# Patient Record
Sex: FEMALE | Race: WHITE | NOT HISPANIC OR LATINO
[De-identification: names, ages, dates, MRNs, and addresses within clinical notes are randomized per-mention and may not be internally consistent; named-entity substitution may affect disease eponyms.]

---

## 2017-01-06 ENCOUNTER — MEDICATION RENEWAL (OUTPATIENT)
Age: 37
End: 2017-01-06

## 2017-02-15 ENCOUNTER — MEDICATION RENEWAL (OUTPATIENT)
Age: 37
End: 2017-02-15

## 2017-03-10 ENCOUNTER — APPOINTMENT (OUTPATIENT)
Dept: FAMILY MEDICINE | Facility: CLINIC | Age: 37
End: 2017-03-10

## 2017-03-10 VITALS
HEART RATE: 103 BPM | OXYGEN SATURATION: 98 % | DIASTOLIC BLOOD PRESSURE: 70 MMHG | WEIGHT: 163 LBS | SYSTOLIC BLOOD PRESSURE: 120 MMHG | BODY MASS INDEX: 27.83 KG/M2 | RESPIRATION RATE: 16 BRPM | HEIGHT: 64 IN

## 2017-03-10 DIAGNOSIS — R21 RASH AND OTHER NONSPECIFIC SKIN ERUPTION: ICD-10-CM

## 2017-03-10 LAB
BILIRUB UR QL STRIP: NEGATIVE
GLUCOSE UR-MCNC: NEGATIVE
HCG UR QL: 0.2 EU/DL
HGB UR QL STRIP.AUTO: NORMAL
KETONES UR-MCNC: NEGATIVE
LEUKOCYTE ESTERASE UR QL STRIP: NORMAL
NITRITE UR QL STRIP: NEGATIVE
PH UR STRIP: 7
PROT UR STRIP-MCNC: NORMAL
SP GR UR STRIP: 1.03

## 2017-03-13 LAB — BACTERIA UR CULT: ABNORMAL

## 2017-03-14 LAB — CORE LAB FLUID CYTOLOGY: NORMAL

## 2017-03-17 ENCOUNTER — APPOINTMENT (OUTPATIENT)
Dept: FAMILY MEDICINE | Facility: CLINIC | Age: 37
End: 2017-03-17

## 2017-03-17 VITALS
HEART RATE: 83 BPM | WEIGHT: 163 LBS | OXYGEN SATURATION: 99 % | RESPIRATION RATE: 16 BRPM | SYSTOLIC BLOOD PRESSURE: 120 MMHG | HEIGHT: 64 IN | DIASTOLIC BLOOD PRESSURE: 70 MMHG | BODY MASS INDEX: 27.83 KG/M2

## 2017-05-01 ENCOUNTER — APPOINTMENT (OUTPATIENT)
Dept: FAMILY MEDICINE | Facility: CLINIC | Age: 37
End: 2017-05-01

## 2017-05-01 VITALS
SYSTOLIC BLOOD PRESSURE: 110 MMHG | DIASTOLIC BLOOD PRESSURE: 60 MMHG | TEMPERATURE: 98.6 F | HEIGHT: 64 IN | OXYGEN SATURATION: 98 % | BODY MASS INDEX: 28 KG/M2 | RESPIRATION RATE: 16 BRPM | WEIGHT: 164 LBS | HEART RATE: 105 BPM

## 2017-06-16 ENCOUNTER — OTHER (OUTPATIENT)
Age: 37
End: 2017-06-16

## 2017-10-12 ENCOUNTER — MEDICATION RENEWAL (OUTPATIENT)
Age: 37
End: 2017-10-12

## 2017-10-16 ENCOUNTER — MESSAGE (OUTPATIENT)
Age: 37
End: 2017-10-16

## 2017-10-18 ENCOUNTER — RESULT REVIEW (OUTPATIENT)
Age: 37
End: 2017-10-18

## 2017-10-23 ENCOUNTER — APPOINTMENT (OUTPATIENT)
Dept: FAMILY MEDICINE | Facility: CLINIC | Age: 37
End: 2017-10-23
Payer: COMMERCIAL

## 2017-10-23 VITALS
TEMPERATURE: 98.1 F | BODY MASS INDEX: 28.51 KG/M2 | HEART RATE: 96 BPM | HEIGHT: 64 IN | DIASTOLIC BLOOD PRESSURE: 70 MMHG | OXYGEN SATURATION: 98 % | SYSTOLIC BLOOD PRESSURE: 120 MMHG | WEIGHT: 167 LBS | RESPIRATION RATE: 16 BRPM

## 2017-10-23 DIAGNOSIS — H10.33 UNSPECIFIED ACUTE CONJUNCTIVITIS, BILATERAL: ICD-10-CM

## 2017-10-23 PROCEDURE — 99214 OFFICE O/P EST MOD 30 MIN: CPT | Mod: 25

## 2017-10-23 PROCEDURE — 94010 BREATHING CAPACITY TEST: CPT

## 2017-10-23 RX ORDER — NITROFURANTOIN (MONOHYDRATE/MACROCRYSTALS) 25; 75 MG/1; MG/1
100 CAPSULE ORAL
Qty: 14 | Refills: 0 | Status: DISCONTINUED | COMMUNITY
Start: 2017-03-10 | End: 2017-10-23

## 2017-10-23 RX ORDER — ALBUTEROL SULFATE 90 UG/1
108 (90 BASE) AEROSOL, METERED RESPIRATORY (INHALATION) EVERY 4 HOURS
Qty: 1 | Refills: 5 | Status: DISCONTINUED | COMMUNITY
Start: 2017-02-15 | End: 2017-10-23

## 2018-03-19 ENCOUNTER — APPOINTMENT (OUTPATIENT)
Dept: FAMILY MEDICINE | Facility: CLINIC | Age: 38
End: 2018-03-19
Payer: COMMERCIAL

## 2018-03-19 ENCOUNTER — LABORATORY RESULT (OUTPATIENT)
Age: 38
End: 2018-03-19

## 2018-03-19 VITALS
WEIGHT: 167 LBS | DIASTOLIC BLOOD PRESSURE: 70 MMHG | SYSTOLIC BLOOD PRESSURE: 120 MMHG | BODY MASS INDEX: 28.51 KG/M2 | HEIGHT: 64 IN | RESPIRATION RATE: 16 BRPM | HEART RATE: 99 BPM | OXYGEN SATURATION: 98 %

## 2018-03-19 DIAGNOSIS — N76.0 ACUTE VAGINITIS: ICD-10-CM

## 2018-03-19 PROCEDURE — 99214 OFFICE O/P EST MOD 30 MIN: CPT

## 2018-03-20 LAB
25(OH)D3 SERPL-MCNC: 22.5 NG/ML
ALBUMIN SERPL ELPH-MCNC: 4.4 G/DL
ALP BLD-CCNC: 72 U/L
ALT SERPL-CCNC: 22 U/L
AMYLASE/CREAT SERPL: 60 U/L
ANION GAP SERPL CALC-SCNC: 18 MMOL/L
APPEARANCE: CLEAR
AST SERPL-CCNC: 24 U/L
BASOPHILS # BLD AUTO: 0.03 K/UL
BASOPHILS NFR BLD AUTO: 0.2 %
BILIRUB SERPL-MCNC: 0.5 MG/DL
BILIRUBIN URINE: NEGATIVE
BLOOD URINE: ABNORMAL
BUN SERPL-MCNC: 17 MG/DL
CALCIUM SERPL-MCNC: 9 MG/DL
CHLORIDE SERPL-SCNC: 103 MMOL/L
CHOLEST SERPL-MCNC: 177 MG/DL
CHOLEST/HDLC SERPL: 2.7 RATIO
CO2 SERPL-SCNC: 20 MMOL/L
COLOR: YELLOW
CREAT SERPL-MCNC: 0.79 MG/DL
CREAT SPEC-SCNC: 110 MG/DL
EOSINOPHIL # BLD AUTO: 0.25 K/UL
EOSINOPHIL NFR BLD AUTO: 2.1 %
ERYTHROCYTE [SEDIMENTATION RATE] IN BLOOD BY WESTERGREN METHOD: 12 MM/HR
FERRITIN SERPL-MCNC: 47 NG/ML
FOLATE SERPL-MCNC: >20 NG/ML
GLUCOSE QUALITATIVE U: NEGATIVE MG/DL
GLUCOSE SERPL-MCNC: 80 MG/DL
HBA1C MFR BLD HPLC: 5.2 %
HCT VFR BLD CALC: 42.5 %
HDLC SERPL-MCNC: 65 MG/DL
HGB BLD-MCNC: 14.1 G/DL
IMM GRANULOCYTES NFR BLD AUTO: 0.2 %
KETONES URINE: NEGATIVE
LDLC SERPL CALC-MCNC: 99 MG/DL
LEUKOCYTE ESTERASE URINE: ABNORMAL
LPL SERPL-CCNC: 32 U/L
LYMPHOCYTES # BLD AUTO: 3.2 K/UL
LYMPHOCYTES NFR BLD AUTO: 26.5 %
MAN DIFF?: NORMAL
MCHC RBC-ENTMCNC: 30.7 PG
MCHC RBC-ENTMCNC: 33.2 GM/DL
MCV RBC AUTO: 92.6 FL
MICROALBUMIN 24H UR DL<=1MG/L-MCNC: 0.3 MG/DL
MICROALBUMIN/CREAT 24H UR-RTO: 3 MG/G
MONOCYTES # BLD AUTO: 0.51 K/UL
MONOCYTES NFR BLD AUTO: 4.2 %
NEUTROPHILS # BLD AUTO: 8.07 K/UL
NEUTROPHILS NFR BLD AUTO: 66.8 %
NITRITE URINE: NEGATIVE
PH URINE: 5
PLATELET # BLD AUTO: 288 K/UL
POTASSIUM SERPL-SCNC: 4 MMOL/L
PROT SERPL-MCNC: 7.3 G/DL
PROTEIN URINE: NEGATIVE MG/DL
PSA FREE FLD-MCNC: NORMAL
PSA FREE SERPL-MCNC: <0.01 NG/ML
PSA SERPL-MCNC: <0.01 NG/ML
RBC # BLD: 4.59 M/UL
RBC # FLD: 13.4 %
RHEUMATOID FACT SER QL: <7 IU/ML
SODIUM SERPL-SCNC: 141 MMOL/L
SPECIFIC GRAVITY URINE: 1.02
T3RU NFR SERPL: 1.1 INDEX
T4 FREE SERPL-MCNC: 1 NG/DL
TRIGL SERPL-MCNC: 67 MG/DL
TSH SERPL-ACNC: 2.41 UIU/ML
UROBILINOGEN URINE: NEGATIVE MG/DL
VIT B12 SERPL-MCNC: 577 PG/ML
WBC # FLD AUTO: 12.08 K/UL

## 2018-03-27 ENCOUNTER — APPOINTMENT (OUTPATIENT)
Dept: FAMILY MEDICINE | Facility: CLINIC | Age: 38
End: 2018-03-27

## 2018-03-27 LAB
ANA PAT FLD IF-IMP: ABNORMAL
ANA SER IF-ACNC: ABNORMAL
CLAM IGE QN: <0.1 KUA/L
CODFISH IGE QN: <0.1 KUA/L
CORN IGE QN: <0.1 KUA/L
COW MILK IGE QN: <0.1 KUA/L
DEPRECATED CLAM IGE RAST QL: 0
DEPRECATED CODFISH IGE RAST QL: 0
DEPRECATED CORN IGE RAST QL: 0
DEPRECATED COW MILK IGE RAST QL: 0
DEPRECATED EGG WHITE IGE RAST QL: 0
DEPRECATED GLUTEN IGE RAST QL: <0.1 KUA/L
DEPRECATED PEANUT IGE RAST QL: 0
DEPRECATED SCALLOP IGE RAST QL: 0.43 KUA/L
DEPRECATED SESAME SEED IGE RAST QL: 0
DEPRECATED SHRIMP IGE RAST QL: 0
DEPRECATED SOYBEAN IGE RAST QL: 0
DEPRECATED WALNUT IGE RAST QL: 0
DEPRECATED WHEAT IGE RAST QL: 0
EGG WHITE IGE QN: <0.1 KUA/L
ENA SS-A AB SER IA-ACNC: <0.2 AL
ENA SS-B AB SER IA-ACNC: <0.2 AL
GLUTEN IGG QN: 0
PEANUT IGE QN: <0.1 KUA/L
SCALLOP IGE QN: <0.1 KUA/L
SCALLOP IGE QN: ABNORMAL
SESAME SEED IGE QN: <0.1 KUA/L
SOYBEAN IGE QN: <0.1 KUA/L
TOTAL IGE SMQN RAST: 165 KU/L
TTG IGA SER IA-ACNC: <5 UNITS
TTG IGA SER-ACNC: NEGATIVE
TTG IGG SER IA-ACNC: <5 UNITS
TTG IGG SER IA-ACNC: NEGATIVE
WALNUT IGE QN: <0.1 KUA/L
WHEAT IGE QN: <0.1 KUA/L

## 2018-04-17 ENCOUNTER — APPOINTMENT (OUTPATIENT)
Dept: FAMILY MEDICINE | Facility: CLINIC | Age: 38
End: 2018-04-17

## 2018-06-04 ENCOUNTER — APPOINTMENT (OUTPATIENT)
Dept: FAMILY MEDICINE | Facility: CLINIC | Age: 38
End: 2018-06-04
Payer: COMMERCIAL

## 2018-06-04 VITALS
RESPIRATION RATE: 22 BRPM | HEART RATE: 94 BPM | SYSTOLIC BLOOD PRESSURE: 118 MMHG | DIASTOLIC BLOOD PRESSURE: 78 MMHG | OXYGEN SATURATION: 98 % | TEMPERATURE: 98.4 F

## 2018-06-04 PROCEDURE — 99214 OFFICE O/P EST MOD 30 MIN: CPT

## 2018-06-06 VITALS — BODY MASS INDEX: 28.51 KG/M2 | HEIGHT: 64 IN | WEIGHT: 167 LBS

## 2018-07-11 ENCOUNTER — APPOINTMENT (OUTPATIENT)
Dept: FAMILY MEDICINE | Facility: CLINIC | Age: 38
End: 2018-07-11
Payer: COMMERCIAL

## 2018-07-11 VITALS
BODY MASS INDEX: 27.32 KG/M2 | RESPIRATION RATE: 23 BRPM | WEIGHT: 170 LBS | SYSTOLIC BLOOD PRESSURE: 112 MMHG | DIASTOLIC BLOOD PRESSURE: 80 MMHG | HEIGHT: 66 IN | HEART RATE: 93 BPM | OXYGEN SATURATION: 98 %

## 2018-07-11 DIAGNOSIS — K82.4 CHOLESTEROLOSIS OF GALLBLADDER: ICD-10-CM

## 2018-07-11 DIAGNOSIS — K82.8 OTHER SPECIFIED DISEASES OF GALLBLADDER: ICD-10-CM

## 2018-07-11 PROCEDURE — 99215 OFFICE O/P EST HI 40 MIN: CPT

## 2018-07-11 RX ORDER — NYSTATIN 100000 [USP'U]/G
100000 CREAM TOPICAL
Qty: 1 | Refills: 0 | Status: DISCONTINUED | COMMUNITY
Start: 2018-03-19 | End: 2018-07-11

## 2018-07-11 RX ORDER — PREDNISONE 10 MG/1
10 TABLET ORAL
Qty: 19 | Refills: 2 | Status: DISCONTINUED | COMMUNITY
Start: 2017-10-23 | End: 2018-07-11

## 2018-07-11 RX ORDER — FLUTICASONE PROPIONATE 50 UG/1
50 SPRAY, METERED NASAL TWICE DAILY
Qty: 1 | Refills: 5 | Status: DISCONTINUED | COMMUNITY
Start: 2018-03-19 | End: 2018-07-11

## 2018-07-11 NOTE — RESULTS/DATA
[] : results reviewed [de-identified] : 8.99/14.9/45.2/307 [de-identified] : Inr: 1.0 [de-identified] : 144/4.0/107/27/10/0.8/91 [de-identified] : NSR @ 65 bpm [de-identified] : Blood type: O Positive\par U/A:Large blood (had menses)\par HCG: Negative

## 2018-07-11 NOTE — PHYSICAL EXAM
[No Acute Distress] : no acute distress [Well Nourished] : well nourished [Well Developed] : well developed [Well-Appearing] : well-appearing [Normal Sclera/Conjunctiva] : normal sclera/conjunctiva [PERRL] : pupils equal round and reactive to light [EOMI] : extraocular movements intact [Normal Outer Ear/Nose] : the outer ears and nose were normal in appearance [Normal Oropharynx] : the oropharynx was normal [No JVD] : no jugular venous distention [Supple] : supple [No Lymphadenopathy] : no lymphadenopathy [No Respiratory Distress] : no respiratory distress  [Clear to Auscultation] : lungs were clear to auscultation bilaterally [No Accessory Muscle Use] : no accessory muscle use [Normal Rate] : normal rate  [Regular Rhythm] : with a regular rhythm [Normal S1, S2] : normal S1 and S2 [No Murmur] : no murmur heard [No Varicosities] : no varicosities [Pedal Pulses Present] : the pedal pulses are present [No Edema] : there was no peripheral edema [No Extremity Clubbing/Cyanosis] : no extremity clubbing/cyanosis [Soft] : abdomen soft [Non Tender] : non-tender [Non-distended] : non-distended [No Masses] : no abdominal mass palpated [No HSM] : no HSM [Normal Bowel Sounds] : normal bowel sounds [Normal Posterior Cervical Nodes] : no posterior cervical lymphadenopathy [Normal Anterior Cervical Nodes] : no anterior cervical lymphadenopathy [No CVA Tenderness] : no CVA  tenderness [No Spinal Tenderness] : no spinal tenderness [No Joint Swelling] : no joint swelling [Grossly Normal Strength/Tone] : grossly normal strength/tone [No Rash] : no rash [Normal Gait] : normal gait [Coordination Grossly Intact] : coordination grossly intact [No Focal Deficits] : no focal deficits [Deep Tendon Reflexes (DTR)] : deep tendon reflexes were 2+ and symmetric [Normal Affect] : the affect was normal [Normal Insight/Judgement] : insight and judgment were intact

## 2018-07-11 NOTE — REVIEW OF SYSTEMS
[Dryness] : dryness  [Abdominal Pain] : abdominal pain [Anxiety] : anxiety [Depression] : depression [Negative] : Heme/Lymph [FreeTextEntry4] : dry mouth

## 2018-07-11 NOTE — HISTORY OF PRESENT ILLNESS
[FreeTextEntry1] : Cholecystectomy [FreeTextEntry2] : 7/13/18 [FreeTextEntry3] : Dr Grace [FreeTextEntry4] : Patient is a 38-year-old female presents today for medical clearance for cholecystectomy at [facility] on 7/13/18 with Dr. Grace. All results available discussed at length. We discussed the procedure, the recovery and will her results at length. All questions answered. Time spent with patient was 40 minutes face-to-face.

## 2018-11-06 ENCOUNTER — APPOINTMENT (OUTPATIENT)
Dept: FAMILY MEDICINE | Facility: CLINIC | Age: 38
End: 2018-11-06
Payer: COMMERCIAL

## 2018-11-06 VITALS
OXYGEN SATURATION: 98 % | BODY MASS INDEX: 27.12 KG/M2 | WEIGHT: 168 LBS | HEART RATE: 98 BPM | DIASTOLIC BLOOD PRESSURE: 82 MMHG | RESPIRATION RATE: 24 BRPM | TEMPERATURE: 98.4 F | SYSTOLIC BLOOD PRESSURE: 116 MMHG

## 2018-11-06 DIAGNOSIS — Z71.89 OTHER SPECIFIED COUNSELING: ICD-10-CM

## 2018-11-06 PROCEDURE — 90658 IIV3 VACCINE SPLT 0.5 ML IM: CPT

## 2018-11-06 PROCEDURE — G0008: CPT

## 2018-11-06 PROCEDURE — 99214 OFFICE O/P EST MOD 30 MIN: CPT | Mod: 25

## 2018-11-07 ENCOUNTER — MED ADMIN CHARGE (OUTPATIENT)
Age: 38
End: 2018-11-07

## 2019-06-28 RX ORDER — ALPRAZOLAM 0.25 MG/1
0.25 TABLET ORAL
Qty: 10 | Refills: 0 | Status: DISCONTINUED | COMMUNITY
Start: 2019-06-28 | End: 2019-06-28

## 2019-09-05 ENCOUNTER — APPOINTMENT (OUTPATIENT)
Dept: FAMILY MEDICINE | Facility: CLINIC | Age: 39
End: 2019-09-05
Payer: COMMERCIAL

## 2019-09-05 VITALS
TEMPERATURE: 98.5 F | SYSTOLIC BLOOD PRESSURE: 106 MMHG | WEIGHT: 168 LBS | HEART RATE: 110 BPM | OXYGEN SATURATION: 98 % | HEIGHT: 66 IN | BODY MASS INDEX: 27 KG/M2 | DIASTOLIC BLOOD PRESSURE: 72 MMHG

## 2019-09-05 LAB — CYTOLOGY CVX/VAG DOC THIN PREP: NORMAL

## 2019-09-05 PROCEDURE — 99214 OFFICE O/P EST MOD 30 MIN: CPT | Mod: 25

## 2019-09-06 ENCOUNTER — CHART COPY (OUTPATIENT)
Age: 39
End: 2019-09-06

## 2019-10-23 ENCOUNTER — APPOINTMENT (OUTPATIENT)
Dept: FAMILY MEDICINE | Facility: CLINIC | Age: 39
End: 2019-10-23
Payer: COMMERCIAL

## 2019-10-23 VITALS
BODY MASS INDEX: 29.32 KG/M2 | TEMPERATURE: 98.7 F | HEIGHT: 65 IN | WEIGHT: 176 LBS | DIASTOLIC BLOOD PRESSURE: 93 MMHG | HEART RATE: 106 BPM | SYSTOLIC BLOOD PRESSURE: 123 MMHG | OXYGEN SATURATION: 96 %

## 2019-10-23 PROCEDURE — 99213 OFFICE O/P EST LOW 20 MIN: CPT

## 2019-10-24 NOTE — COUNSELING
[Fall prevention counseling provided] : Fall prevention counseling provided [Adequate lighting] : Adequate lighting

## 2019-10-24 NOTE — HEALTH RISK ASSESSMENT
[] : No [No] : In the past 12 months have you used drugs other than those required for medical reasons? No [de-identified] : none  [de-identified] : none  [de-identified] : none  [de-identified] : none

## 2019-10-24 NOTE — PHYSICAL EXAM
[Normal] : affect was normal and insight and judgment were intact [de-identified] : nasal passages erythema and purulent discharge, frontal sinuses are tender bilaterally. postnasal drip.\par

## 2019-10-24 NOTE — HISTORY OF PRESENT ILLNESS
[FreeTextEntry8] : BALAJI RIVERA is a 39 year old female who presents for sinus pressure, post nasal drip and cough. \par She reports her symptoms started 2 days ago and are getting worse. The sinus pressure is severe she has difficulty concentrating and had to leave work yesterday. \par Denies fevers

## 2019-10-24 NOTE — REVIEW OF SYSTEMS
[Fatigue] : fatigue [Earache] : earache [Nasal Discharge] : nasal discharge [Sore Throat] : sore throat [Postnasal Drip] : postnasal drip [Negative] : Heme/Lymph [FreeTextEntry4] : \par

## 2020-01-09 ENCOUNTER — APPOINTMENT (OUTPATIENT)
Dept: FAMILY MEDICINE | Facility: CLINIC | Age: 40
End: 2020-01-09
Payer: COMMERCIAL

## 2020-01-09 VITALS
OXYGEN SATURATION: 98 % | BODY MASS INDEX: 29.16 KG/M2 | DIASTOLIC BLOOD PRESSURE: 79 MMHG | HEIGHT: 65 IN | SYSTOLIC BLOOD PRESSURE: 113 MMHG | TEMPERATURE: 97.8 F | HEART RATE: 82 BPM | WEIGHT: 175 LBS

## 2020-01-09 PROCEDURE — 99213 OFFICE O/P EST LOW 20 MIN: CPT

## 2020-01-09 NOTE — PHYSICAL EXAM
[Ill-Appearing] : ill-appearing [de-identified] : left ear injected, canal erythema  [Normal] : affect was normal and insight and judgment were intact

## 2020-01-09 NOTE — HISTORY OF PRESENT ILLNESS
[FreeTextEntry8] : BALAJI RIVERA is a 39 year old female who presents with left ear pains for the past week. SHe denies a fever, she has a cough that is dry and non productive. \par Postnasal drip

## 2020-01-09 NOTE — HEALTH RISK ASSESSMENT
[] : No [No] : No [No falls in past year] : Patient reported no falls in the past year [0] : 1) Little interest or pleasure doing things: Not at all (0)

## 2020-01-15 ENCOUNTER — LABORATORY RESULT (OUTPATIENT)
Age: 40
End: 2020-01-15

## 2020-01-15 ENCOUNTER — NON-APPOINTMENT (OUTPATIENT)
Age: 40
End: 2020-01-15

## 2020-01-15 ENCOUNTER — APPOINTMENT (OUTPATIENT)
Dept: FAMILY MEDICINE | Facility: CLINIC | Age: 40
End: 2020-01-15
Payer: COMMERCIAL

## 2020-01-15 VITALS
HEART RATE: 96 BPM | WEIGHT: 175 LBS | SYSTOLIC BLOOD PRESSURE: 112 MMHG | HEIGHT: 65 IN | DIASTOLIC BLOOD PRESSURE: 73 MMHG | OXYGEN SATURATION: 98 % | RESPIRATION RATE: 14 BRPM | BODY MASS INDEX: 29.16 KG/M2

## 2020-01-15 PROCEDURE — 94010 BREATHING CAPACITY TEST: CPT

## 2020-01-15 PROCEDURE — 93000 ELECTROCARDIOGRAM COMPLETE: CPT

## 2020-01-15 PROCEDURE — 36415 COLL VENOUS BLD VENIPUNCTURE: CPT

## 2020-01-15 PROCEDURE — 99396 PREV VISIT EST AGE 40-64: CPT | Mod: 25

## 2020-01-22 LAB
25(OH)D3 SERPL-MCNC: 26.5 NG/ML
ALBUMIN SERPL ELPH-MCNC: 4.4 G/DL
ALP BLD-CCNC: 71 U/L
ALT SERPL-CCNC: 20 U/L
ANION GAP SERPL CALC-SCNC: 12 MMOL/L
APPEARANCE: ABNORMAL
AST SERPL-CCNC: 21 U/L
BASOPHILS # BLD AUTO: 0.07 K/UL
BASOPHILS NFR BLD AUTO: 0.6 %
BILIRUB SERPL-MCNC: 0.3 MG/DL
BILIRUBIN URINE: NEGATIVE
BLOOD URINE: NEGATIVE
BUN SERPL-MCNC: 12 MG/DL
CALCIUM SERPL-MCNC: 9.7 MG/DL
CHLORIDE SERPL-SCNC: 103 MMOL/L
CHOLEST SERPL-MCNC: 171 MG/DL
CHOLEST/HDLC SERPL: 3 RATIO
CO2 SERPL-SCNC: 23 MMOL/L
COLOR: YELLOW
CREAT SERPL-MCNC: 0.88 MG/DL
CREAT SPEC-SCNC: 129 MG/DL
EOSINOPHIL # BLD AUTO: 0.36 K/UL
EOSINOPHIL NFR BLD AUTO: 3 %
ESTIMATED AVERAGE GLUCOSE: 111 MG/DL
FERRITIN SERPL-MCNC: 72 NG/ML
FOLATE SERPL-MCNC: >20 NG/ML
GLUCOSE QUALITATIVE U: NEGATIVE
GLUCOSE SERPL-MCNC: 89 MG/DL
HBA1C MFR BLD HPLC: 5.5 %
HCT VFR BLD CALC: 47 %
HDLC SERPL-MCNC: 57 MG/DL
HGB BLD-MCNC: 15 G/DL
IMM GRANULOCYTES NFR BLD AUTO: 0.3 %
KETONES URINE: NEGATIVE
LDLC SERPL CALC-MCNC: 92 MG/DL
LEUKOCYTE ESTERASE URINE: NEGATIVE
LYMPHOCYTES # BLD AUTO: 3.18 K/UL
LYMPHOCYTES NFR BLD AUTO: 26.7 %
MAN DIFF?: NORMAL
MCHC RBC-ENTMCNC: 30.4 PG
MCHC RBC-ENTMCNC: 31.9 GM/DL
MCV RBC AUTO: 95.1 FL
MICROALBUMIN 24H UR DL<=1MG/L-MCNC: <1.2 MG/DL
MICROALBUMIN/CREAT 24H UR-RTO: NORMAL MG/G
MONOCYTES # BLD AUTO: 0.54 K/UL
MONOCYTES NFR BLD AUTO: 4.5 %
NEUTROPHILS # BLD AUTO: 7.72 K/UL
NEUTROPHILS NFR BLD AUTO: 64.9 %
NITRITE URINE: NEGATIVE
PH URINE: 6
PLATELET # BLD AUTO: 331 K/UL
POTASSIUM SERPL-SCNC: 4.8 MMOL/L
PROT SERPL-MCNC: 7.1 G/DL
PROTEIN URINE: NEGATIVE
RBC # BLD: 4.94 M/UL
RBC # FLD: 13.5 %
SODIUM SERPL-SCNC: 138 MMOL/L
SPECIFIC GRAVITY URINE: 1.02
T3RU NFR SERPL: 1.2 TBI
T4 FREE SERPL-MCNC: 1 NG/DL
TRIGL SERPL-MCNC: 114 MG/DL
TSH SERPL-ACNC: 1.96 UIU/ML
UROBILINOGEN URINE: NORMAL
VIT B12 SERPL-MCNC: 545 PG/ML
WBC # FLD AUTO: 11.91 K/UL

## 2020-01-27 ENCOUNTER — APPOINTMENT (OUTPATIENT)
Dept: FAMILY MEDICINE | Facility: CLINIC | Age: 40
End: 2020-01-27
Payer: COMMERCIAL

## 2020-01-27 ENCOUNTER — LABORATORY RESULT (OUTPATIENT)
Age: 40
End: 2020-01-27

## 2020-01-27 VITALS
TEMPERATURE: 98.7 F | OXYGEN SATURATION: 97 % | SYSTOLIC BLOOD PRESSURE: 149 MMHG | DIASTOLIC BLOOD PRESSURE: 82 MMHG | HEART RATE: 89 BPM

## 2020-01-27 DIAGNOSIS — Z87.09 PERSONAL HISTORY OF OTHER DISEASES OF THE RESPIRATORY SYSTEM: ICD-10-CM

## 2020-01-27 DIAGNOSIS — H66.92 OTITIS MEDIA, UNSPECIFIED, LEFT EAR: ICD-10-CM

## 2020-01-27 PROCEDURE — 99214 OFFICE O/P EST MOD 30 MIN: CPT | Mod: 25

## 2020-01-27 PROCEDURE — 36415 COLL VENOUS BLD VENIPUNCTURE: CPT

## 2020-01-28 LAB
ERYTHROCYTE [SEDIMENTATION RATE] IN BLOOD BY WESTERGREN METHOD: 19 MM/HR
RHEUMATOID FACT SER QL: <10 IU/ML

## 2020-01-29 LAB
B BURGDOR AB SER-IMP: NEGATIVE
B BURGDOR IGG+IGM SER QL IB: NORMAL
B BURGDOR IGG+IGM SER QL: <0.01 INDEX
B MICROTI AB SER QL: NORMAL
BABESIA ANTIBODIES, IGG: NORMAL
BABESIA ANTIBODIES, IGM: NORMAL
DEPRECATED GLUTEN IGE RAST QL: <0.1 KUA/L
EBV EA AB SER IA-ACNC: 12.2 U/ML
EBV EA AB TITR SER IF: NEGATIVE
EBV EA IGG SER QL IA: <3 U/ML
EBV EA IGG SER-ACNC: POSITIVE
EBV EA IGM SER IA-ACNC: NEGATIVE
EBV PATRN SPEC IB-IMP: NORMAL
EBV VCA IGG SER IA-ACNC: 113 U/ML
EBV VCA IGM SER QL IA: <10 U/ML
EPSTEIN-BARR VIRUS CAPSID ANTIGEN IGG: POSITIVE
GLUTEN IGG QN: 0
TOTAL IGE SMQN RAST: 153 KU/L
TTG IGA SER IA-ACNC: <1.2 U/ML
TTG IGA SER-ACNC: NEGATIVE
TTG IGG SER IA-ACNC: 3.2 U/ML
TTG IGG SER IA-ACNC: NEGATIVE

## 2020-01-30 LAB
ANAPLASMA PHAGOCYTO IGM COMENT: NORMAL
ANAPLASMA PHAGOCYTO IGM COMMENT: NORMAL
ANAPLASMA PHAGOCYTOPHILIA IGG ANTIBODIES: NORMAL
ANAPLASMA PHAGOCYTOPHILIA IGM ANTIBODIES: NORMAL
CLAM IGE QN: <0.1 KUA/L
CODFISH IGE QN: <0.1 KUA/L
CORN IGE QN: <0.1 KUA/L
COW MILK IGE QN: <0.1 KUA/L
DEPRECATED CLAM IGE RAST QL: 0
DEPRECATED CODFISH IGE RAST QL: 0
DEPRECATED CORN IGE RAST QL: 0
DEPRECATED COW MILK IGE RAST QL: 0
DEPRECATED EGG WHITE IGE RAST QL: 0
DEPRECATED PEANUT IGE RAST QL: 0
DEPRECATED SCALLOP IGE RAST QL: 0.22 KUA/L
DEPRECATED SESAME SEED IGE RAST QL: 0
DEPRECATED SHRIMP IGE RAST QL: 0
DEPRECATED SOYBEAN IGE RAST QL: 0
DEPRECATED WALNUT IGE RAST QL: 0
DEPRECATED WHEAT IGE RAST QL: 0
EGG WHITE IGE QN: <0.1 KUA/L
EHRLICIA CHAFFEENIS IGG ANTIBODIES: NORMAL
EHRLICIA CHAFFEENIS IGG COMMENT: NORMAL
EHRLICIA CHAFFEENIS IGG INTERP: NORMAL
EHRLICIA CHAFFEENIS IGM ANTIBODIES: NORMAL
PEANUT IGE QN: <0.1 KUA/L
R RICKETTSI IGG CSF-ACNC: NEGATIVE
R RICKETTSI IGM CSF-ACNC: 0.54 INDEX
SCALLOP IGE QN: <0.1 KUA/L
SCALLOP IGE QN: NORMAL
SESAME SEED IGE QN: <0.1 KUA/L
SOYBEAN IGE QN: <0.1 KUA/L
WALNUT IGE QN: <0.1 KUA/L
WHEAT IGE QN: <0.1 KUA/L

## 2020-01-31 LAB — ANA SER IF-ACNC: NEGATIVE

## 2020-02-03 ENCOUNTER — APPOINTMENT (OUTPATIENT)
Dept: FAMILY MEDICINE | Facility: CLINIC | Age: 40
End: 2020-02-03
Payer: COMMERCIAL

## 2020-02-03 VITALS
HEART RATE: 83 BPM | RESPIRATION RATE: 18 BRPM | SYSTOLIC BLOOD PRESSURE: 142 MMHG | WEIGHT: 175 LBS | HEIGHT: 65 IN | TEMPERATURE: 98.7 F | DIASTOLIC BLOOD PRESSURE: 87 MMHG | OXYGEN SATURATION: 100 % | BODY MASS INDEX: 29.16 KG/M2

## 2020-02-03 VITALS — DIASTOLIC BLOOD PRESSURE: 82 MMHG | SYSTOLIC BLOOD PRESSURE: 136 MMHG

## 2020-02-03 PROCEDURE — 99213 OFFICE O/P EST LOW 20 MIN: CPT

## 2020-02-03 RX ORDER — NEOMYCIN SULFATE, POLYMYXIN B SULFATE, HYDROCORTISONE 3.5; 10000; 1 MG/ML; [USP'U]/ML; MG/ML
1 SOLUTION/ DROPS AURICULAR (OTIC) 4 TIMES DAILY
Qty: 1 | Refills: 0 | Status: DISCONTINUED | COMMUNITY
Start: 2020-01-09 | End: 2020-02-03

## 2020-02-03 RX ORDER — SULFAMETHOXAZOLE AND TRIMETHOPRIM 800; 160 MG/1; MG/1
800-160 TABLET ORAL
Qty: 14 | Refills: 0 | Status: DISCONTINUED | COMMUNITY
Start: 2020-01-09 | End: 2020-02-03

## 2020-02-03 NOTE — HISTORY OF PRESENT ILLNESS
[FreeTextEntry1] : follow up results  [de-identified] : BALAJI RIVERA is a 39 year old female who presents for follow up results. \par we discussed her blood results at length, I answered all her questions. \par She reports she continues to feel very fatigue, we discussed a rheumatologist consult. She is agreeable

## 2020-02-10 ENCOUNTER — APPOINTMENT (OUTPATIENT)
Dept: ORTHOPEDIC SURGERY | Facility: CLINIC | Age: 40
End: 2020-02-10
Payer: COMMERCIAL

## 2020-02-10 VITALS
TEMPERATURE: 98.6 F | DIASTOLIC BLOOD PRESSURE: 84 MMHG | BODY MASS INDEX: 29.16 KG/M2 | HEART RATE: 106 BPM | HEIGHT: 65 IN | WEIGHT: 175 LBS | SYSTOLIC BLOOD PRESSURE: 128 MMHG

## 2020-02-10 DIAGNOSIS — Z78.9 OTHER SPECIFIED HEALTH STATUS: ICD-10-CM

## 2020-02-10 PROCEDURE — 99203 OFFICE O/P NEW LOW 30 MIN: CPT

## 2020-02-20 NOTE — DISCUSSION/SUMMARY
[de-identified] : At this time, she was instructed on home therapeutic modalities for the left knee prepatellar bursitis.  She will follow up with me on an as needed basis.

## 2020-02-20 NOTE — CONSULT LETTER
[Dear  ___] : Dear  [unfilled], [Consult Letter:] : I had the pleasure of evaluating your patient, [unfilled]. [Please see my note below.] : Please see my note below. [Sincerely,] : Sincerely, [Consult Closing:] : Thank you very much for allowing me to participate in the care of this patient.  If you have any questions, please do not hesitate to contact me. [FreeTextEntry3] : Melvin Azar III, M.D. \par [facility]/mr

## 2020-02-20 NOTE — HISTORY OF PRESENT ILLNESS
[de-identified] : The patient comes in today with complaints of pain to her anterior aspect of her left knee.  She does a lot of kneeling while at work.  She gets pain and swelling.  The patient describes the pain as burning.   [] : No

## 2020-02-20 NOTE — ADDENDUM
[FreeTextEntry1] : This note was written by Fina Tran on 02/15/2020 acting as scribe for Melvin Azar III, MD

## 2020-03-11 ENCOUNTER — APPOINTMENT (OUTPATIENT)
Dept: FAMILY MEDICINE | Facility: CLINIC | Age: 40
End: 2020-03-11
Payer: COMMERCIAL

## 2020-03-11 VITALS
HEIGHT: 65 IN | BODY MASS INDEX: 29.16 KG/M2 | TEMPERATURE: 98 F | DIASTOLIC BLOOD PRESSURE: 78 MMHG | WEIGHT: 175 LBS | SYSTOLIC BLOOD PRESSURE: 124 MMHG | RESPIRATION RATE: 18 BRPM | HEART RATE: 90 BPM | OXYGEN SATURATION: 99 %

## 2020-03-11 DIAGNOSIS — M70.42 PREPATELLAR BURSITIS, LEFT KNEE: ICD-10-CM

## 2020-03-11 DIAGNOSIS — J01.00 ACUTE MAXILLARY SINUSITIS, UNSPECIFIED: ICD-10-CM

## 2020-03-11 DIAGNOSIS — M25.521 PAIN IN RIGHT ELBOW: ICD-10-CM

## 2020-03-11 PROCEDURE — 99214 OFFICE O/P EST MOD 30 MIN: CPT | Mod: 25

## 2020-03-11 PROCEDURE — 36415 COLL VENOUS BLD VENIPUNCTURE: CPT

## 2020-03-16 LAB
BASOPHILS # BLD AUTO: 0.07 K/UL
BASOPHILS NFR BLD AUTO: 0.6 %
EBV EA AB SER IA-ACNC: 11.4 U/ML
EBV EA AB TITR SER IF: NEGATIVE
EBV EA IGG SER QL IA: <3 U/ML
EBV EA IGG SER-ACNC: POSITIVE
EBV EA IGM SER IA-ACNC: NEGATIVE
EBV PATRN SPEC IB-IMP: NORMAL
EBV VCA IGG SER IA-ACNC: 119 U/ML
EBV VCA IGM SER QL IA: <10 U/ML
EOSINOPHIL # BLD AUTO: 0.3 K/UL
EOSINOPHIL NFR BLD AUTO: 2.4 %
EPSTEIN-BARR VIRUS CAPSID ANTIGEN IGG: POSITIVE
HCT VFR BLD CALC: 47 %
HGB BLD-MCNC: 14.6 G/DL
IMM GRANULOCYTES NFR BLD AUTO: 0.2 %
LYMPHOCYTES # BLD AUTO: 3.08 K/UL
LYMPHOCYTES NFR BLD AUTO: 24.8 %
MAN DIFF?: NORMAL
MCHC RBC-ENTMCNC: 30 PG
MCHC RBC-ENTMCNC: 31.1 GM/DL
MCV RBC AUTO: 96.5 FL
MONOCYTES # BLD AUTO: 0.47 K/UL
MONOCYTES NFR BLD AUTO: 3.8 %
NEUTROPHILS # BLD AUTO: 8.48 K/UL
NEUTROPHILS NFR BLD AUTO: 68.2 %
PLATELET # BLD AUTO: 314 K/UL
RBC # BLD: 4.87 M/UL
RBC # FLD: 13.1 %
WBC # FLD AUTO: 12.43 K/UL

## 2020-04-20 ENCOUNTER — APPOINTMENT (OUTPATIENT)
Dept: FAMILY MEDICINE | Facility: CLINIC | Age: 40
End: 2020-04-20
Payer: COMMERCIAL

## 2020-04-20 PROCEDURE — 99213 OFFICE O/P EST LOW 20 MIN: CPT | Mod: 95

## 2020-04-27 ENCOUNTER — APPOINTMENT (OUTPATIENT)
Dept: FAMILY MEDICINE | Facility: CLINIC | Age: 40
End: 2020-04-27
Payer: COMMERCIAL

## 2020-04-27 PROCEDURE — 99213 OFFICE O/P EST LOW 20 MIN: CPT | Mod: 95

## 2020-04-28 ENCOUNTER — APPOINTMENT (OUTPATIENT)
Dept: FAMILY MEDICINE | Facility: CLINIC | Age: 40
End: 2020-04-28
Payer: COMMERCIAL

## 2020-04-28 PROCEDURE — 99442: CPT

## 2020-04-29 RX ORDER — DULOXETINE HYDROCHLORIDE 60 MG/1
60 CAPSULE, DELAYED RELEASE PELLETS ORAL
Qty: 90 | Refills: 3 | Status: DISCONTINUED | COMMUNITY
Start: 2020-04-27 | End: 2020-04-29

## 2020-04-29 RX ORDER — BENZONATATE 200 MG/1
200 CAPSULE ORAL 3 TIMES DAILY
Qty: 30 | Refills: 0 | Status: DISCONTINUED | COMMUNITY
Start: 2017-10-23 | End: 2020-04-29

## 2020-04-30 ENCOUNTER — TRANSCRIPTION ENCOUNTER (OUTPATIENT)
Age: 40
End: 2020-04-30

## 2020-05-26 ENCOUNTER — APPOINTMENT (OUTPATIENT)
Dept: FAMILY MEDICINE | Facility: CLINIC | Age: 40
End: 2020-05-26
Payer: COMMERCIAL

## 2020-05-26 VITALS
OXYGEN SATURATION: 97 % | BODY MASS INDEX: 29.37 KG/M2 | WEIGHT: 172 LBS | HEART RATE: 115 BPM | DIASTOLIC BLOOD PRESSURE: 87 MMHG | TEMPERATURE: 99 F | SYSTOLIC BLOOD PRESSURE: 128 MMHG | HEIGHT: 64 IN

## 2020-05-26 PROCEDURE — 99214 OFFICE O/P EST MOD 30 MIN: CPT

## 2020-05-26 RX ORDER — CYCLOSPORINE 0.5 MG/ML
0.05 EMULSION OPHTHALMIC
Qty: 1 | Refills: 5 | Status: DISCONTINUED | COMMUNITY
End: 2020-05-26

## 2020-06-03 ENCOUNTER — APPOINTMENT (OUTPATIENT)
Dept: FAMILY MEDICINE | Facility: CLINIC | Age: 40
End: 2020-06-03

## 2020-07-31 ENCOUNTER — APPOINTMENT (OUTPATIENT)
Dept: FAMILY MEDICINE | Facility: CLINIC | Age: 40
End: 2020-07-31
Payer: COMMERCIAL

## 2020-07-31 PROCEDURE — 99213 OFFICE O/P EST LOW 20 MIN: CPT | Mod: 95

## 2020-07-31 NOTE — REVIEW OF SYSTEMS
[Negative] : Psychiatric [Skin Rash] : skin rash [Itching] : no itching [Mole Changes] : no mole changes [Nail Changes] : no nail changes [Hair Changes] : no hair changes

## 2020-07-31 NOTE — ASSESSMENT
[FreeTextEntry1] : -Rash on the body probably allergic reaction\par -COVID testing pending\par -continue prednisolone ,antihistamine and take famotidine  \par -Advised patient to take pictures \par \par \par RTO if rash not getting better couldn’t appreciate rash over the video

## 2020-07-31 NOTE — HISTORY OF PRESENT ILLNESS
[Home] : at home, [unfilled] , at the time of the visit. [Verbal consent obtained from patient] : the patient, [unfilled] [Medical Office: ([facility])___] : at the medical office located in  [FreeTextEntry8] : BALAJI RIVERA 40 year yrs old  female presents with CC of Rash on the body - 2 weeks  .\par [place] rash started in arm and back last Wednesday .Last Sunday she developed rash again knows she had reaction to the sunscreen and went up until shoulder went [facility] .They  didn'’t know what it was .Put on prednisone .This Tuesday called Dr Abdul for the appointment and they said  it could be symptom of COVID .\par Tested for COVID Tuesday waiting for the results .\par She states it comes with heat at work she doesn’t get it but she gets at home when its hot .Looks like heat rash.Still on prednisone finishes  on Tuesday.\par  Denies any other complaints. No Fever, Chills, Nausea, Vomiting, Diarrhea, Headache, Chest Pain, Shortness of breath or Abdominal pain.\par \par

## 2020-07-31 NOTE — PHYSICAL EXAM
[No Acute Distress] : no acute distress [Well Nourished] : well nourished [No Focal Deficits] : no focal deficits [Normal Insight/Judgement] : insight and judgment were intact

## 2020-08-06 ENCOUNTER — LABORATORY RESULT (OUTPATIENT)
Age: 40
End: 2020-08-06

## 2020-08-06 ENCOUNTER — APPOINTMENT (OUTPATIENT)
Dept: FAMILY MEDICINE | Facility: CLINIC | Age: 40
End: 2020-08-06
Payer: COMMERCIAL

## 2020-08-06 VITALS
TEMPERATURE: 98.2 F | HEART RATE: 109 BPM | SYSTOLIC BLOOD PRESSURE: 135 MMHG | BODY MASS INDEX: 28.85 KG/M2 | WEIGHT: 169 LBS | DIASTOLIC BLOOD PRESSURE: 88 MMHG | OXYGEN SATURATION: 100 % | HEIGHT: 64 IN

## 2020-08-06 PROCEDURE — 36415 COLL VENOUS BLD VENIPUNCTURE: CPT

## 2020-08-06 PROCEDURE — 96372 THER/PROPH/DIAG INJ SC/IM: CPT

## 2020-08-06 PROCEDURE — 99214 OFFICE O/P EST MOD 30 MIN: CPT | Mod: 25

## 2020-08-06 RX ORDER — DEXAMETHASONE SODIUM PHOSPHATE 4 MG/ML
4 INJECTION, SOLUTION INTRAMUSCULAR; INTRAVENOUS
Qty: 0 | Refills: 0 | Status: COMPLETED | OUTPATIENT
Start: 2020-08-06

## 2020-08-06 RX ADMIN — DEXAMETHASONE SODIUM PHOSPHATE 0 MG/ML: 4 INJECTION, SOLUTION INTRA-ARTICULAR; INTRALESIONAL; INTRAMUSCULAR; INTRAVENOUS; SOFT TISSUE at 00:00

## 2020-08-07 ENCOUNTER — LABORATORY RESULT (OUTPATIENT)
Age: 40
End: 2020-08-07

## 2020-08-11 LAB
25(OH)D3 SERPL-MCNC: 27.8 NG/ML
ALBUMIN SERPL ELPH-MCNC: 4.2 G/DL
ALP BLD-CCNC: 68 U/L
ALT SERPL-CCNC: 19 U/L
ANA SER IF-ACNC: NEGATIVE
ANAPLASMA PHAGOCYTO IGM COMENT: NORMAL
ANAPLASMA PHAGOCYTO IGM COMMENT: NORMAL
ANAPLASMA PHAGOCYTOPHILIA IGG ANTIBODIES: NORMAL
ANAPLASMA PHAGOCYTOPHILIA IGM ANTIBODIES: NORMAL
ANION GAP SERPL CALC-SCNC: 12 MMOL/L
AST SERPL-CCNC: 14 U/L
B BURGDOR AB SER-IMP: NEGATIVE
B BURGDOR IGG+IGM SER QL IB: NORMAL
B BURGDOR IGG+IGM SER QL: 0.02 INDEX
B MICROTI AB SER QL: NORMAL
BABESIA ANTIBODIES, IGG: NORMAL
BABESIA ANTIBODIES, IGM: NORMAL
BASOPHILS # BLD AUTO: 0.08 K/UL
BASOPHILS NFR BLD AUTO: 0.8 %
BEEF IGE QN: <0.1 KUA/L
BILIRUB SERPL-MCNC: 0.3 MG/DL
BUN SERPL-MCNC: 11 MG/DL
CALCIUM SERPL-MCNC: 9.4 MG/DL
CHLORIDE SERPL-SCNC: 106 MMOL/L
CLAM IGE QN: <0.1 KUA/L
CO2 SERPL-SCNC: 23 MMOL/L
CODFISH IGE QN: <0.1 KUA/L
CORN IGE QN: <0.1 KUA/L
COW MILK IGE QN: <0.1 KUA/L
CREAT SERPL-MCNC: 0.74 MG/DL
DEPRECATED BEEF IGE RAST QL: 0
DEPRECATED CLAM IGE RAST QL: 0
DEPRECATED CODFISH IGE RAST QL: 0
DEPRECATED CORN IGE RAST QL: 0
DEPRECATED COW MILK IGE RAST QL: 0
DEPRECATED EGG WHITE IGE RAST QL: 0
DEPRECATED GLUTEN IGE RAST QL: <0.1 KUA/L
DEPRECATED PEANUT IGE RAST QL: 0
DEPRECATED PORK IGE RAST QL: 0
DEPRECATED SCALLOP IGE RAST QL: 0.19 KUA/L
DEPRECATED SESAME SEED IGE RAST QL: 0
DEPRECATED SHRIMP IGE RAST QL: 0
DEPRECATED SOYBEAN IGE RAST QL: 0
DEPRECATED WALNUT IGE RAST QL: 0
DEPRECATED WHEAT IGE RAST QL: 0
EGG WHITE IGE QN: <0.1 KUA/L
EHRLICIA CHAFFEENIS IGG ANTIBODIES: NORMAL
EHRLICIA CHAFFEENIS IGG COMMENT: NORMAL
EHRLICIA CHAFFEENIS IGG INTERP: NORMAL
EHRLICIA CHAFFEENIS IGM ANTIBODIES: NORMAL
EOSINOPHIL # BLD AUTO: 0.43 K/UL
EOSINOPHIL NFR BLD AUTO: 4.1 %
ERYTHROCYTE [SEDIMENTATION RATE] IN BLOOD BY WESTERGREN METHOD: 24 MM/HR
ESTIMATED AVERAGE GLUCOSE: 103 MG/DL
FERRITIN SERPL-MCNC: 41 NG/ML
FOLATE SERPL-MCNC: >20 NG/ML
GLUCOSE SERPL-MCNC: 98 MG/DL
GLUTEN IGG QN: 0
HBA1C MFR BLD HPLC: 5.2 %
HCT VFR BLD CALC: 48.6 %
HGB BLD-MCNC: 15.2 G/DL
IMM GRANULOCYTES NFR BLD AUTO: 0.5 %
LYMPHOCYTES # BLD AUTO: 3.49 K/UL
LYMPHOCYTES NFR BLD AUTO: 33 %
MAN DIFF?: NORMAL
MCHC RBC-ENTMCNC: 30.3 PG
MCHC RBC-ENTMCNC: 31.3 GM/DL
MCV RBC AUTO: 97 FL
MONOCYTES # BLD AUTO: 0.63 K/UL
MONOCYTES NFR BLD AUTO: 5.9 %
NEUTROPHILS # BLD AUTO: 5.91 K/UL
NEUTROPHILS NFR BLD AUTO: 55.7 %
PEANUT IGE QN: <0.1 KUA/L
PLATELET # BLD AUTO: 345 K/UL
PORK IGE QN: <0.1 KUA/L
POTASSIUM SERPL-SCNC: 4.3 MMOL/L
PROT SERPL-MCNC: 6.8 G/DL
R RICKETTSI IGG CSF-ACNC: NEGATIVE
R RICKETTSI IGM CSF-ACNC: 0.62 INDEX
RBC # BLD: 5.01 M/UL
RBC # FLD: 14.2 %
RHEUMATOID FACT SER QL: 11 IU/ML
SCALLOP IGE QN: <0.1 KUA/L
SCALLOP IGE QN: NORMAL
SESAME SEED IGE QN: <0.1 KUA/L
SODIUM SERPL-SCNC: 141 MMOL/L
SOYBEAN IGE QN: <0.1 KUA/L
T3RU NFR SERPL: 1 TBI
T4 FREE SERPL-MCNC: 1.2 NG/DL
TOTAL IGE SMQN RAST: 136 KU/L
TSH SERPL-ACNC: 1.25 UIU/ML
TTG IGA SER IA-ACNC: <1.2 U/ML
TTG IGA SER-ACNC: NEGATIVE
TTG IGG SER IA-ACNC: 5.3 U/ML
TTG IGG SER IA-ACNC: NEGATIVE
VIT B12 SERPL-MCNC: 664 PG/ML
WALNUT IGE QN: <0.1 KUA/L
WBC # FLD AUTO: 10.59 K/UL
WHEAT IGE QN: <0.1 KUA/L

## 2020-08-14 LAB
F. TULARENSIS AB, IGG: NEGATIVE
F. TULARENSIS AB, IGM: NEGATIVE
F. TULARENSIS INTERPRETATION: NORMAL
GALACTOSE-ALPHA-1,3-GALACTOSE IGE: <0.1 KU/L

## 2020-10-19 ENCOUNTER — TRANSCRIPTION ENCOUNTER (OUTPATIENT)
Age: 40
End: 2020-10-19

## 2020-11-01 ENCOUNTER — TRANSCRIPTION ENCOUNTER (OUTPATIENT)
Age: 40
End: 2020-11-01

## 2020-11-23 ENCOUNTER — TRANSCRIPTION ENCOUNTER (OUTPATIENT)
Age: 40
End: 2020-11-23

## 2020-12-05 ENCOUNTER — RX RENEWAL (OUTPATIENT)
Age: 40
End: 2020-12-05

## 2020-12-16 PROBLEM — N76.0 ACUTE VAGINITIS: Status: RESOLVED | Noted: 2018-03-19 | Resolved: 2020-12-16

## 2020-12-23 PROBLEM — Z87.09 HISTORY OF ACUTE SINUSITIS: Status: RESOLVED | Noted: 2019-10-23 | Resolved: 2020-12-23

## 2020-12-23 PROBLEM — H66.92 LEFT OTITIS MEDIA: Status: RESOLVED | Noted: 2020-01-09 | Resolved: 2020-12-23

## 2021-01-15 ENCOUNTER — RX RENEWAL (OUTPATIENT)
Age: 41
End: 2021-01-15

## 2021-01-20 ENCOUNTER — APPOINTMENT (OUTPATIENT)
Dept: FAMILY MEDICINE | Facility: CLINIC | Age: 41
End: 2021-01-20
Payer: COMMERCIAL

## 2021-01-20 DIAGNOSIS — T50.Z95A ADVERSE EFFECT OF OTHER VACCINES AND BIOLOGICAL SUBSTANCES, INITIAL ENCOUNTER: ICD-10-CM

## 2021-01-20 PROCEDURE — 99214 OFFICE O/P EST MOD 30 MIN: CPT | Mod: 95

## 2021-01-24 PROBLEM — T50.Z95A ADVERSE REACTION TO VACCINE, INITIAL ENCOUNTER: Status: ACTIVE | Noted: 2021-01-24

## 2021-02-08 ENCOUNTER — NON-APPOINTMENT (OUTPATIENT)
Age: 41
End: 2021-02-08

## 2021-02-08 ENCOUNTER — APPOINTMENT (OUTPATIENT)
Dept: FAMILY MEDICINE | Facility: CLINIC | Age: 41
End: 2021-02-08
Payer: COMMERCIAL

## 2021-02-08 VITALS
RESPIRATION RATE: 16 BRPM | WEIGHT: 167 LBS | BODY MASS INDEX: 28.51 KG/M2 | SYSTOLIC BLOOD PRESSURE: 157 MMHG | OXYGEN SATURATION: 99 % | DIASTOLIC BLOOD PRESSURE: 90 MMHG | HEIGHT: 64 IN | TEMPERATURE: 98 F | HEART RATE: 97 BPM

## 2021-02-08 PROCEDURE — 99072 ADDL SUPL MATRL&STAF TM PHE: CPT

## 2021-02-08 PROCEDURE — 92559: CPT

## 2021-02-08 PROCEDURE — 93000 ELECTROCARDIOGRAM COMPLETE: CPT

## 2021-02-08 PROCEDURE — 99396 PREV VISIT EST AGE 40-64: CPT | Mod: 25

## 2021-03-01 ENCOUNTER — APPOINTMENT (OUTPATIENT)
Dept: FAMILY MEDICINE | Facility: CLINIC | Age: 41
End: 2021-03-01
Payer: COMMERCIAL

## 2021-03-01 VITALS
SYSTOLIC BLOOD PRESSURE: 168 MMHG | RESPIRATION RATE: 16 BRPM | TEMPERATURE: 98.6 F | OXYGEN SATURATION: 99 % | BODY MASS INDEX: 28.51 KG/M2 | DIASTOLIC BLOOD PRESSURE: 94 MMHG | HEIGHT: 64 IN | HEART RATE: 100 BPM | WEIGHT: 167 LBS

## 2021-03-01 VITALS — TEMPERATURE: 97.7 F

## 2021-03-01 LAB
BILIRUB UR QL STRIP: NEGATIVE
CLARITY UR: CLEAR
COLLECTION METHOD: NORMAL
GLUCOSE UR-MCNC: NEGATIVE
HCG UR QL: 0.2 EU/DL
HGB UR QL STRIP.AUTO: NEGATIVE
KETONES UR-MCNC: NORMAL
LEUKOCYTE ESTERASE UR QL STRIP: NEGATIVE
NITRITE UR QL STRIP: NEGATIVE
PH UR STRIP: 5.5
PROT UR STRIP-MCNC: NEGATIVE
SP GR UR STRIP: 1.03

## 2021-03-01 PROCEDURE — 99214 OFFICE O/P EST MOD 30 MIN: CPT | Mod: 25

## 2021-03-01 PROCEDURE — 81003 URINALYSIS AUTO W/O SCOPE: CPT | Mod: QW

## 2021-03-01 PROCEDURE — 99072 ADDL SUPL MATRL&STAF TM PHE: CPT

## 2021-03-01 PROCEDURE — G0444 DEPRESSION SCREEN ANNUAL: CPT

## 2021-03-29 ENCOUNTER — RESULT REVIEW (OUTPATIENT)
Age: 41
End: 2021-03-29

## 2021-05-06 ENCOUNTER — APPOINTMENT (OUTPATIENT)
Dept: FAMILY MEDICINE | Facility: CLINIC | Age: 41
End: 2021-05-06
Payer: COMMERCIAL

## 2021-05-06 DIAGNOSIS — M54.2 CERVICALGIA: ICD-10-CM

## 2021-05-06 DIAGNOSIS — M25.512 PAIN IN LEFT SHOULDER: ICD-10-CM

## 2021-05-06 PROCEDURE — 99214 OFFICE O/P EST MOD 30 MIN: CPT

## 2021-05-06 PROCEDURE — 99072 ADDL SUPL MATRL&STAF TM PHE: CPT

## 2021-05-08 PROBLEM — M25.512 ACUTE PAIN OF LEFT SHOULDER: Status: ACTIVE | Noted: 2021-05-08

## 2021-05-08 PROBLEM — M54.2 ACUTE NECK PAIN: Status: ACTIVE | Noted: 2021-05-06

## 2021-08-16 ENCOUNTER — NON-APPOINTMENT (OUTPATIENT)
Age: 41
End: 2021-08-16

## 2021-08-16 ENCOUNTER — APPOINTMENT (OUTPATIENT)
Dept: FAMILY MEDICINE | Facility: CLINIC | Age: 41
End: 2021-08-16
Payer: COMMERCIAL

## 2021-08-16 VITALS
HEIGHT: 65 IN | TEMPERATURE: 97.7 F | HEART RATE: 89 BPM | BODY MASS INDEX: 27.49 KG/M2 | WEIGHT: 165 LBS | RESPIRATION RATE: 18 BRPM | DIASTOLIC BLOOD PRESSURE: 87 MMHG | SYSTOLIC BLOOD PRESSURE: 135 MMHG | OXYGEN SATURATION: 100 %

## 2021-08-16 DIAGNOSIS — R07.89 OTHER CHEST PAIN: ICD-10-CM

## 2021-08-16 PROCEDURE — 93000 ELECTROCARDIOGRAM COMPLETE: CPT

## 2021-08-16 PROCEDURE — 99214 OFFICE O/P EST MOD 30 MIN: CPT | Mod: 25

## 2021-08-16 RX ORDER — DULOXETINE HYDROCHLORIDE 30 MG/1
30 CAPSULE, DELAYED RELEASE PELLETS ORAL
Qty: 30 | Refills: 5 | Status: DISCONTINUED | COMMUNITY
Start: 2018-06-04 | End: 2021-08-16

## 2021-08-21 PROBLEM — R07.89 CHEST TIGHTNESS: Status: ACTIVE | Noted: 2021-08-21

## 2021-09-06 ENCOUNTER — TRANSCRIPTION ENCOUNTER (OUTPATIENT)
Age: 41
End: 2021-09-06

## 2021-09-14 ENCOUNTER — APPOINTMENT (OUTPATIENT)
Dept: FAMILY MEDICINE | Facility: CLINIC | Age: 41
End: 2021-09-14

## 2021-10-02 ENCOUNTER — MED ADMIN CHARGE (OUTPATIENT)
Age: 41
End: 2021-10-02

## 2021-10-29 ENCOUNTER — APPOINTMENT (OUTPATIENT)
Dept: FAMILY MEDICINE | Facility: CLINIC | Age: 41
End: 2021-10-29

## 2021-10-29 ENCOUNTER — APPOINTMENT (OUTPATIENT)
Dept: FAMILY MEDICINE | Facility: CLINIC | Age: 41
End: 2021-10-29
Payer: COMMERCIAL

## 2021-10-29 VITALS
WEIGHT: 160 LBS | DIASTOLIC BLOOD PRESSURE: 81 MMHG | BODY MASS INDEX: 26.66 KG/M2 | RESPIRATION RATE: 100 BRPM | OXYGEN SATURATION: 100 % | SYSTOLIC BLOOD PRESSURE: 134 MMHG | HEIGHT: 65 IN | TEMPERATURE: 97 F | HEART RATE: 85 BPM

## 2021-10-29 DIAGNOSIS — Z23 ENCOUNTER FOR IMMUNIZATION: ICD-10-CM

## 2021-10-29 PROCEDURE — 99214 OFFICE O/P EST MOD 30 MIN: CPT | Mod: 25

## 2021-10-29 PROCEDURE — 90688 IIV4 VACCINE SPLT 0.5 ML IM: CPT

## 2021-10-29 PROCEDURE — G0008: CPT

## 2021-10-29 PROCEDURE — 36415 COLL VENOUS BLD VENIPUNCTURE: CPT

## 2021-10-29 RX ORDER — CYCLOBENZAPRINE HYDROCHLORIDE 10 MG/1
10 TABLET, FILM COATED ORAL 3 TIMES DAILY
Qty: 30 | Refills: 2 | Status: ACTIVE | COMMUNITY
Start: 2021-10-29 | End: 1900-01-01

## 2021-11-01 LAB
EBV EA AB SER IA-ACNC: 11.4 U/ML
EBV EA AB TITR SER IF: NEGATIVE
EBV EA IGG SER QL IA: <3 U/ML
EBV EA IGG SER-ACNC: POSITIVE
EBV EA IGM SER IA-ACNC: NEGATIVE
EBV PATRN SPEC IB-IMP: NORMAL
EBV VCA IGG SER IA-ACNC: 118 U/ML
EBV VCA IGM SER QL IA: <10 U/ML
EPSTEIN-BARR VIRUS CAPSID ANTIGEN IGG: POSITIVE
HAV IGM SER QL: NONREACTIVE
HBV CORE IGM SER QL: NONREACTIVE
HBV SURFACE AG SER QL: NONREACTIVE
HCV AB SER QL: NONREACTIVE
HCV S/CO RATIO: 0.23 S/CO
HIV1+2 AB SPEC QL IA.RAPID: NONREACTIVE
HSV 1+2 IGG SER IA-IMP: NEGATIVE
HSV1 IGG SER QL: 0.03 INDEX

## 2021-11-02 PROBLEM — Z23 ENCOUNTER FOR VACCINATION: Status: ACTIVE | Noted: 2018-11-06

## 2021-11-03 LAB — RPR SER-TITR: NORMAL

## 2021-11-18 DIAGNOSIS — N39.0 URINARY TRACT INFECTION, SITE NOT SPECIFIED: ICD-10-CM

## 2021-11-18 DIAGNOSIS — A49.9 URINARY TRACT INFECTION, SITE NOT SPECIFIED: ICD-10-CM

## 2021-12-01 ENCOUNTER — TRANSCRIPTION ENCOUNTER (OUTPATIENT)
Age: 41
End: 2021-12-01

## 2021-12-15 ENCOUNTER — TRANSCRIPTION ENCOUNTER (OUTPATIENT)
Age: 41
End: 2021-12-15

## 2021-12-20 ENCOUNTER — TRANSCRIPTION ENCOUNTER (OUTPATIENT)
Age: 41
End: 2021-12-20

## 2022-01-04 ENCOUNTER — TRANSCRIPTION ENCOUNTER (OUTPATIENT)
Age: 42
End: 2022-01-04

## 2022-01-23 LAB — BACTERIA UR CULT: NORMAL

## 2022-03-02 ENCOUNTER — APPOINTMENT (OUTPATIENT)
Dept: FAMILY MEDICINE | Facility: CLINIC | Age: 42
End: 2022-03-02

## 2022-03-10 RX ORDER — PAROXETINE HYDROCHLORIDE 20 MG/1
20 TABLET, FILM COATED ORAL DAILY
Qty: 90 | Refills: 3 | Status: DISCONTINUED | COMMUNITY
Start: 2021-08-16 | End: 2022-03-10

## 2022-03-21 ENCOUNTER — APPOINTMENT (OUTPATIENT)
Dept: FAMILY MEDICINE | Facility: CLINIC | Age: 42
End: 2022-03-21

## 2022-04-04 ENCOUNTER — RESULT REVIEW (OUTPATIENT)
Age: 42
End: 2022-04-04

## 2022-04-11 ENCOUNTER — APPOINTMENT (OUTPATIENT)
Dept: FAMILY MEDICINE | Facility: CLINIC | Age: 42
End: 2022-04-11
Payer: COMMERCIAL

## 2022-04-11 ENCOUNTER — LABORATORY RESULT (OUTPATIENT)
Age: 42
End: 2022-04-11

## 2022-04-11 ENCOUNTER — NON-APPOINTMENT (OUTPATIENT)
Age: 42
End: 2022-04-11

## 2022-04-11 VITALS
OXYGEN SATURATION: 99 % | WEIGHT: 160 LBS | BODY MASS INDEX: 26.66 KG/M2 | DIASTOLIC BLOOD PRESSURE: 80 MMHG | HEIGHT: 65 IN | TEMPERATURE: 98 F | SYSTOLIC BLOOD PRESSURE: 143 MMHG | RESPIRATION RATE: 16 BRPM | HEART RATE: 102 BPM

## 2022-04-11 LAB — CYTOLOGY CVX/VAG DOC THIN PREP: NORMAL

## 2022-04-11 PROCEDURE — 36415 COLL VENOUS BLD VENIPUNCTURE: CPT

## 2022-04-11 PROCEDURE — G0444 DEPRESSION SCREEN ANNUAL: CPT | Mod: 59

## 2022-04-11 PROCEDURE — 99396 PREV VISIT EST AGE 40-64: CPT | Mod: 25

## 2022-04-11 PROCEDURE — 96160 PT-FOCUSED HLTH RISK ASSMT: CPT | Mod: 59

## 2022-04-11 PROCEDURE — 93000 ELECTROCARDIOGRAM COMPLETE: CPT | Mod: 59

## 2022-04-15 LAB
25(OH)D3 SERPL-MCNC: 14.8 NG/ML
ALBUMIN SERPL ELPH-MCNC: 4.4 G/DL
ALP BLD-CCNC: 88 U/L
ALT SERPL-CCNC: 18 U/L
ANA SER IF-ACNC: NEGATIVE
ANAPLASMA PHAGOCYTOPHILIA IGG ANTIBODIES: NEGATIVE
ANAPLASMA PHAGOCYTOPHILIA IGM ANTIBODIES: NEGATIVE
ANION GAP SERPL CALC-SCNC: 13 MMOL/L
AST SERPL-CCNC: 21 U/L
B BURGDOR AB SER-IMP: NEGATIVE
B BURGDOR IGG+IGM SER QL: 0.04 INDEX
BABESIA ANTIBODIES, IGG: NORMAL
BABESIA ANTIBODIES, IGM: NORMAL
BASOPHILS # BLD AUTO: 0.07 K/UL
BASOPHILS NFR BLD AUTO: 0.8 %
BILIRUB SERPL-MCNC: 0.2 MG/DL
BUN SERPL-MCNC: 11 MG/DL
CALCIUM SERPL-MCNC: 9.1 MG/DL
CHLORIDE SERPL-SCNC: 106 MMOL/L
CHOLEST SERPL-MCNC: 195 MG/DL
CO2 SERPL-SCNC: 22 MMOL/L
CREAT SERPL-MCNC: 0.77 MG/DL
EBV EA AB SER IA-ACNC: 11.8 U/ML
EBV EA AB TITR SER IF: NEGATIVE
EBV EA IGG SER QL IA: <3 U/ML
EBV EA IGG SER-ACNC: POSITIVE
EBV EA IGM SER IA-ACNC: NEGATIVE
EBV PATRN SPEC IB-IMP: NORMAL
EBV VCA IGG SER IA-ACNC: 114 U/ML
EBV VCA IGM SER QL IA: <10 U/ML
EGFR: 98 ML/MIN/1.73M2
EHRLICIA CHAFFEENIS IGG ANTIBODIES: NEGATIVE
EHRLICIA CHAFFEENIS IGM ANTIBODIES: NEGATIVE
EOSINOPHIL # BLD AUTO: 0.23 K/UL
EOSINOPHIL NFR BLD AUTO: 2.5 %
EPSTEIN-BARR VIRUS CAPSID ANTIGEN IGG: POSITIVE
ERYTHROCYTE [SEDIMENTATION RATE] IN BLOOD BY WESTERGREN METHOD: 15 MM/HR
ESTIMATED AVERAGE GLUCOSE: 108 MG/DL
F. TULARENSIS AB, IGG: NEGATIVE
F. TULARENSIS AB, IGM: NEGATIVE
F. TULARENSIS INTERPRETATION: NORMAL
FERRITIN SERPL-MCNC: 46 NG/ML
FOLATE SERPL-MCNC: 14.4 NG/ML
GLUCOSE SERPL-MCNC: 88 MG/DL
HAV IGM SER QL: NONREACTIVE
HBA1C MFR BLD HPLC: 5.4 %
HBV CORE IGM SER QL: NONREACTIVE
HBV SURFACE AG SER QL: NONREACTIVE
HCT VFR BLD CALC: 45.5 %
HCV AB SER QL: NONREACTIVE
HCV S/CO RATIO: 0.11 S/CO
HDLC SERPL-MCNC: 64 MG/DL
HGB BLD-MCNC: 14.7 G/DL
HIV1+2 AB SPEC QL IA.RAPID: NONREACTIVE
HSV 1+2 IGG SER IA-IMP: NEGATIVE
HSV1 IGG SER QL: 0.03 INDEX
HSV1-DNA: NOT DETECTED
HSV2-DNA: NOT DETECTED
IMM GRANULOCYTES NFR BLD AUTO: 0.2 %
LDLC SERPL CALC-MCNC: 107 MG/DL
LYMPHOCYTES # BLD AUTO: 2.88 K/UL
LYMPHOCYTES NFR BLD AUTO: 30.9 %
MAN DIFF?: NORMAL
MCHC RBC-ENTMCNC: 31.1 PG
MCHC RBC-ENTMCNC: 32.3 GM/DL
MCV RBC AUTO: 96.2 FL
MONOCYTES # BLD AUTO: 0.44 K/UL
MONOCYTES NFR BLD AUTO: 4.7 %
NEUTROPHILS # BLD AUTO: 5.69 K/UL
NEUTROPHILS NFR BLD AUTO: 60.9 %
NONHDLC SERPL-MCNC: 131 MG/DL
PLATELET # BLD AUTO: 341 K/UL
POTASSIUM SERPL-SCNC: 4.8 MMOL/L
PROT SERPL-MCNC: 7 G/DL
RBC # BLD: 4.73 M/UL
RBC # FLD: 13.5 %
RHEUMATOID FACT SER QL: <10 IU/ML
RPR SER-TITR: NORMAL
SODIUM SERPL-SCNC: 141 MMOL/L
T3RU NFR SERPL: 1.1 TBI
T4 FREE SERPL-MCNC: 1 NG/DL
TRIGL SERPL-MCNC: 118 MG/DL
TSH SERPL-ACNC: 2.42 UIU/ML
VIT B12 SERPL-MCNC: 425 PG/ML
WBC # FLD AUTO: 9.33 K/UL

## 2022-04-19 LAB
R RICKETTSI IGG CSF-ACNC: NEGATIVE
R RICKETTSI IGM CSF-ACNC: 0.59 INDEX

## 2022-05-09 RX ORDER — PAROXETINE HYDROCHLORIDE 40 MG/1
40 TABLET, FILM COATED ORAL DAILY
Qty: 90 | Refills: 3 | Status: DISCONTINUED | COMMUNITY
Start: 2022-03-10 | End: 2022-05-09

## 2022-05-26 ENCOUNTER — APPOINTMENT (OUTPATIENT)
Dept: FAMILY MEDICINE | Facility: CLINIC | Age: 42
End: 2022-05-26
Payer: COMMERCIAL

## 2022-05-26 VITALS
HEIGHT: 64 IN | TEMPERATURE: 98 F | BODY MASS INDEX: 28.34 KG/M2 | RESPIRATION RATE: 14 BRPM | WEIGHT: 166 LBS | DIASTOLIC BLOOD PRESSURE: 79 MMHG | SYSTOLIC BLOOD PRESSURE: 142 MMHG | OXYGEN SATURATION: 98 % | HEART RATE: 96 BPM

## 2022-05-26 DIAGNOSIS — R00.2 PALPITATIONS: ICD-10-CM

## 2022-05-26 DIAGNOSIS — B35.1 TINEA UNGUIUM: ICD-10-CM

## 2022-05-26 PROCEDURE — 99214 OFFICE O/P EST MOD 30 MIN: CPT

## 2022-05-30 PROBLEM — R00.2 PALPITATIONS: Status: ACTIVE | Noted: 2017-03-10

## 2022-08-02 ENCOUNTER — APPOINTMENT (OUTPATIENT)
Dept: FAMILY MEDICINE | Facility: CLINIC | Age: 42
End: 2022-08-02

## 2022-08-02 VITALS
OXYGEN SATURATION: 100 % | HEART RATE: 90 BPM | DIASTOLIC BLOOD PRESSURE: 84 MMHG | BODY MASS INDEX: 29.19 KG/M2 | TEMPERATURE: 98 F | WEIGHT: 171 LBS | RESPIRATION RATE: 14 BRPM | SYSTOLIC BLOOD PRESSURE: 129 MMHG | HEIGHT: 64 IN

## 2022-08-02 PROCEDURE — 36415 COLL VENOUS BLD VENIPUNCTURE: CPT

## 2022-08-02 PROCEDURE — 99214 OFFICE O/P EST MOD 30 MIN: CPT | Mod: 25

## 2022-08-05 LAB
HAV IGM SER QL: NONREACTIVE
HBV CORE IGM SER QL: NONREACTIVE
HBV SURFACE AG SER QL: NONREACTIVE
HCV AB SER QL: NONREACTIVE
HCV S/CO RATIO: 0.09 S/CO
HIV1+2 AB SPEC QL IA.RAPID: NONREACTIVE
HSV 1+2 IGG SER IA-IMP: NEGATIVE
HSV1 IGG SER QL: 0.04 INDEX

## 2022-08-07 LAB — RPR SER-TITR: NORMAL

## 2022-08-11 RX ORDER — NITROFURANTOIN (MONOHYDRATE/MACROCRYSTALS) 25; 75 MG/1; MG/1
100 CAPSULE ORAL
Qty: 14 | Refills: 0 | Status: DISCONTINUED | COMMUNITY
Start: 2021-11-18 | End: 2022-08-11

## 2022-08-11 RX ORDER — CLARITHROMYCIN 500 MG/1
500 TABLET, FILM COATED ORAL
Qty: 20 | Refills: 0 | Status: DISCONTINUED | COMMUNITY
Start: 2019-10-23 | End: 2022-08-11

## 2022-08-12 RX ORDER — CIPROFLOXACIN HYDROCHLORIDE AND HYDROCORTISONE 2; 10 MG/ML; MG/ML
0.2-1 SUSPENSION/ DROPS AURICULAR (OTIC) TWICE DAILY
Qty: 1 | Refills: 0 | Status: DISCONTINUED | COMMUNITY
Start: 2022-08-11 | End: 2022-08-12

## 2022-11-02 ENCOUNTER — APPOINTMENT (OUTPATIENT)
Dept: FAMILY MEDICINE | Facility: CLINIC | Age: 42
End: 2022-11-02

## 2022-11-02 VITALS
OXYGEN SATURATION: 96 % | WEIGHT: 171 LBS | HEIGHT: 64 IN | TEMPERATURE: 97.7 F | HEART RATE: 100 BPM | SYSTOLIC BLOOD PRESSURE: 140 MMHG | DIASTOLIC BLOOD PRESSURE: 90 MMHG | BODY MASS INDEX: 29.19 KG/M2

## 2022-11-02 DIAGNOSIS — N84.0 POLYP OF CORPUS UTERI: ICD-10-CM

## 2022-11-02 PROCEDURE — 36415 COLL VENOUS BLD VENIPUNCTURE: CPT

## 2022-11-02 PROCEDURE — 99214 OFFICE O/P EST MOD 30 MIN: CPT | Mod: 25

## 2022-11-02 RX ORDER — PROPRANOLOL HYDROCHLORIDE 10 MG/1
10 TABLET ORAL 3 TIMES DAILY
Qty: 90 | Refills: 1 | Status: ACTIVE | COMMUNITY
Start: 2022-11-02

## 2022-11-03 ENCOUNTER — OUTPATIENT (OUTPATIENT)
Dept: OUTPATIENT SERVICES | Facility: HOSPITAL | Age: 42
LOS: 1 days | End: 2022-11-03
Payer: COMMERCIAL

## 2022-11-03 ENCOUNTER — LABORATORY RESULT (OUTPATIENT)
Age: 42
End: 2022-11-03

## 2022-11-03 ENCOUNTER — NON-APPOINTMENT (OUTPATIENT)
Age: 42
End: 2022-11-03

## 2022-11-03 DIAGNOSIS — Z01.818 ENCOUNTER FOR OTHER PREPROCEDURAL EXAMINATION: ICD-10-CM

## 2022-11-03 LAB
ANION GAP SERPL CALC-SCNC: 10 MMOL/L — SIGNIFICANT CHANGE UP (ref 5–17)
APTT BLD: 30.2 SEC — SIGNIFICANT CHANGE UP (ref 27.5–35.5)
BASOPHILS # BLD AUTO: 0.06 K/UL — SIGNIFICANT CHANGE UP (ref 0–0.2)
BASOPHILS NFR BLD AUTO: 0.6 % — SIGNIFICANT CHANGE UP (ref 0–2)
BUN SERPL-MCNC: 16.4 MG/DL — SIGNIFICANT CHANGE UP (ref 8–20)
CALCIUM SERPL-MCNC: 8.7 MG/DL — SIGNIFICANT CHANGE UP (ref 8.4–10.5)
CHLORIDE SERPL-SCNC: 104 MMOL/L — SIGNIFICANT CHANGE UP (ref 96–108)
CO2 SERPL-SCNC: 25 MMOL/L — SIGNIFICANT CHANGE UP (ref 22–29)
CREAT SERPL-MCNC: 0.73 MG/DL — SIGNIFICANT CHANGE UP (ref 0.5–1.3)
EGFR: 104 ML/MIN/1.73M2 — SIGNIFICANT CHANGE UP
EOSINOPHIL # BLD AUTO: 0.25 K/UL — SIGNIFICANT CHANGE UP (ref 0–0.5)
EOSINOPHIL NFR BLD AUTO: 2.4 % — SIGNIFICANT CHANGE UP (ref 0–6)
GLUCOSE SERPL-MCNC: 97 MG/DL — SIGNIFICANT CHANGE UP (ref 70–99)
HCG SERPL-ACNC: <4 MIU/ML — SIGNIFICANT CHANGE UP
HCT VFR BLD CALC: 45.7 % — HIGH (ref 34.5–45)
HGB BLD-MCNC: 15.2 G/DL — SIGNIFICANT CHANGE UP (ref 11.5–15.5)
IMM GRANULOCYTES NFR BLD AUTO: 0.2 % — SIGNIFICANT CHANGE UP (ref 0–0.9)
INR BLD: 0.99 RATIO — SIGNIFICANT CHANGE UP (ref 0.88–1.16)
LYMPHOCYTES # BLD AUTO: 3.72 K/UL — HIGH (ref 1–3.3)
LYMPHOCYTES # BLD AUTO: 35.1 % — SIGNIFICANT CHANGE UP (ref 13–44)
MCHC RBC-ENTMCNC: 31 PG — SIGNIFICANT CHANGE UP (ref 27–34)
MCHC RBC-ENTMCNC: 33.3 GM/DL — SIGNIFICANT CHANGE UP (ref 32–36)
MCV RBC AUTO: 93.1 FL — SIGNIFICANT CHANGE UP (ref 80–100)
MONOCYTES # BLD AUTO: 0.61 K/UL — SIGNIFICANT CHANGE UP (ref 0–0.9)
MONOCYTES NFR BLD AUTO: 5.7 % — SIGNIFICANT CHANGE UP (ref 2–14)
NEUTROPHILS # BLD AUTO: 5.95 K/UL — SIGNIFICANT CHANGE UP (ref 1.8–7.4)
NEUTROPHILS NFR BLD AUTO: 56 % — SIGNIFICANT CHANGE UP (ref 43–77)
PLATELET # BLD AUTO: 330 K/UL — SIGNIFICANT CHANGE UP (ref 150–400)
POTASSIUM SERPL-MCNC: 4.4 MMOL/L — SIGNIFICANT CHANGE UP (ref 3.5–5.3)
POTASSIUM SERPL-SCNC: 4.4 MMOL/L — SIGNIFICANT CHANGE UP (ref 3.5–5.3)
PROTHROM AB SERPL-ACNC: 11.5 SEC — SIGNIFICANT CHANGE UP (ref 10.5–13.4)
RBC # BLD: 4.91 M/UL — SIGNIFICANT CHANGE UP (ref 3.8–5.2)
RBC # FLD: 13.1 % — SIGNIFICANT CHANGE UP (ref 10.3–14.5)
SODIUM SERPL-SCNC: 139 MMOL/L — SIGNIFICANT CHANGE UP (ref 135–145)
WBC # BLD: 10.61 K/UL — HIGH (ref 3.8–10.5)
WBC # FLD AUTO: 10.61 K/UL — HIGH (ref 3.8–10.5)

## 2022-11-03 PROCEDURE — G0463: CPT

## 2022-11-03 PROCEDURE — 80048 BASIC METABOLIC PNL TOTAL CA: CPT

## 2022-11-03 PROCEDURE — 85730 THROMBOPLASTIN TIME PARTIAL: CPT

## 2022-11-03 PROCEDURE — 84702 CHORIONIC GONADOTROPIN TEST: CPT

## 2022-11-03 PROCEDURE — 93010 ELECTROCARDIOGRAM REPORT: CPT

## 2022-11-03 PROCEDURE — 85025 COMPLETE CBC W/AUTO DIFF WBC: CPT

## 2022-11-03 PROCEDURE — 93005 ELECTROCARDIOGRAM TRACING: CPT

## 2022-11-03 PROCEDURE — 36415 COLL VENOUS BLD VENIPUNCTURE: CPT

## 2022-11-03 PROCEDURE — 85610 PROTHROMBIN TIME: CPT

## 2022-11-04 LAB
BASOPHILS # BLD AUTO: 0.11 K/UL
BASOPHILS NFR BLD AUTO: 0.8 %
EOSINOPHIL # BLD AUTO: 0.28 K/UL
EOSINOPHIL NFR BLD AUTO: 2.1 %
HCT VFR BLD CALC: 44.2 %
HGB BLD-MCNC: 14.3 G/DL
IMM GRANULOCYTES NFR BLD AUTO: 0.7 %
LYMPHOCYTES # BLD AUTO: 4.04 K/UL
LYMPHOCYTES NFR BLD AUTO: 30.4 %
MAN DIFF?: NORMAL
MCHC RBC-ENTMCNC: 30.6 PG
MCHC RBC-ENTMCNC: 32.4 GM/DL
MCV RBC AUTO: 94.4 FL
MONOCYTES # BLD AUTO: 0.78 K/UL
MONOCYTES NFR BLD AUTO: 5.9 %
NEUTROPHILS # BLD AUTO: 7.98 K/UL
NEUTROPHILS NFR BLD AUTO: 60.1 %
PLATELET # BLD AUTO: 336 K/UL
RBC # BLD: 4.68 M/UL
RBC # FLD: 13.3 %
WBC # FLD AUTO: 13.28 K/UL

## 2022-11-08 ENCOUNTER — RESULT REVIEW (OUTPATIENT)
Age: 42
End: 2022-11-08

## 2022-11-08 LAB
APPEARANCE: CLEAR
BACTERIA: NEGATIVE
BILIRUBIN URINE: NEGATIVE
BLOOD URINE: NEGATIVE
CALCIUM OXALATE CRYSTALS: ABNORMAL
COLOR: YELLOW
GLUCOSE QUALITATIVE U: NEGATIVE
HYALINE CASTS: 3 /LPF
KETONES URINE: NEGATIVE
LEUKOCYTE ESTERASE URINE: NEGATIVE
MICROSCOPIC-UA: NORMAL
NITRITE URINE: NEGATIVE
PH URINE: 6
PROTEIN URINE: NORMAL
RED BLOOD CELLS URINE: 0 /HPF
SPECIFIC GRAVITY URINE: 1.03
SQUAMOUS EPITHELIAL CELLS: 3 /HPF
URINE COMMENTS: NORMAL
UROBILINOGEN URINE: NORMAL
WHITE BLOOD CELLS URINE: 4 /HPF

## 2022-11-22 ENCOUNTER — APPOINTMENT (OUTPATIENT)
Dept: FAMILY MEDICINE | Facility: CLINIC | Age: 42
End: 2022-11-22

## 2022-11-22 VITALS
SYSTOLIC BLOOD PRESSURE: 128 MMHG | OXYGEN SATURATION: 99 % | RESPIRATION RATE: 14 BRPM | WEIGHT: 180 LBS | HEART RATE: 80 BPM | TEMPERATURE: 98 F | HEIGHT: 65 IN | BODY MASS INDEX: 29.99 KG/M2 | DIASTOLIC BLOOD PRESSURE: 84 MMHG

## 2022-11-22 DIAGNOSIS — J45.909 UNSPECIFIED ASTHMA, UNCOMPLICATED: ICD-10-CM

## 2022-11-22 PROCEDURE — G0444 DEPRESSION SCREEN ANNUAL: CPT | Mod: 59

## 2022-11-22 PROCEDURE — 99214 OFFICE O/P EST MOD 30 MIN: CPT | Mod: 25

## 2022-11-22 PROCEDURE — 96160 PT-FOCUSED HLTH RISK ASSMT: CPT | Mod: 59

## 2022-11-25 PROBLEM — J45.909 ACUTE ASTHMATIC BRONCHITIS: Status: ACTIVE | Noted: 2017-10-23

## 2023-01-04 ENCOUNTER — APPOINTMENT (OUTPATIENT)
Dept: FAMILY MEDICINE | Facility: CLINIC | Age: 43
End: 2023-01-04
Payer: COMMERCIAL

## 2023-01-04 VITALS
OXYGEN SATURATION: 99 % | DIASTOLIC BLOOD PRESSURE: 88 MMHG | TEMPERATURE: 98 F | RESPIRATION RATE: 18 BRPM | WEIGHT: 180 LBS | HEART RATE: 80 BPM | SYSTOLIC BLOOD PRESSURE: 138 MMHG | HEIGHT: 65 IN | BODY MASS INDEX: 29.99 KG/M2

## 2023-01-04 DIAGNOSIS — U07.1 COVID-19: ICD-10-CM

## 2023-01-04 DIAGNOSIS — J45.21 MILD INTERMITTENT ASTHMA WITH (ACUTE) EXACERBATION: ICD-10-CM

## 2023-01-04 PROCEDURE — 99214 OFFICE O/P EST MOD 30 MIN: CPT

## 2023-01-23 ENCOUNTER — LABORATORY RESULT (OUTPATIENT)
Age: 43
End: 2023-01-23

## 2023-01-23 ENCOUNTER — APPOINTMENT (OUTPATIENT)
Dept: FAMILY MEDICINE | Facility: CLINIC | Age: 43
End: 2023-01-23
Payer: COMMERCIAL

## 2023-01-23 VITALS
HEART RATE: 87 BPM | RESPIRATION RATE: 14 BRPM | TEMPERATURE: 98 F | OXYGEN SATURATION: 99 % | HEIGHT: 65 IN | SYSTOLIC BLOOD PRESSURE: 122 MMHG | DIASTOLIC BLOOD PRESSURE: 86 MMHG

## 2023-01-23 DIAGNOSIS — G47.00 INSOMNIA, UNSPECIFIED: ICD-10-CM

## 2023-01-23 PROCEDURE — G0444 DEPRESSION SCREEN ANNUAL: CPT | Mod: 59

## 2023-01-23 PROCEDURE — 99214 OFFICE O/P EST MOD 30 MIN: CPT | Mod: 25

## 2023-01-23 PROCEDURE — 36415 COLL VENOUS BLD VENIPUNCTURE: CPT

## 2023-01-23 PROCEDURE — 96160 PT-FOCUSED HLTH RISK ASSMT: CPT | Mod: 59

## 2023-01-23 RX ORDER — HYDROCORTISONE 25 MG/G
2.5 CREAM TOPICAL 3 TIMES DAILY
Qty: 1 | Refills: 5 | Status: DISCONTINUED | COMMUNITY
Start: 2021-11-02 | End: 2023-01-23

## 2023-01-23 RX ORDER — POLYETHYLENE GLYCOL 400 AND PROPYLENE GLYCOL 4; 3 MG/ML; MG/ML
0.4-0.3 SOLUTION/ DROPS OPHTHALMIC
Refills: 0 | Status: DISCONTINUED | COMMUNITY
End: 2023-01-23

## 2023-01-23 RX ORDER — HYDROCORTISONE VALERATE 2 MG/G
0.2 CREAM TOPICAL TWICE DAILY
Qty: 1 | Refills: 2 | Status: DISCONTINUED | COMMUNITY
Start: 2019-09-05 | End: 2023-01-23

## 2023-01-23 RX ORDER — CLARITHROMYCIN 500 MG/1
500 TABLET, FILM COATED ORAL TWICE DAILY
Qty: 20 | Refills: 0 | Status: ACTIVE | COMMUNITY
Start: 2017-10-23 | End: 1900-01-01

## 2023-01-23 RX ORDER — BENZONATATE 200 MG/1
200 CAPSULE ORAL 3 TIMES DAILY
Qty: 15 | Refills: 0 | Status: DISCONTINUED | COMMUNITY
Start: 2022-11-22 | End: 2023-01-23

## 2023-01-23 RX ORDER — CICLOPIROX 80 MG/ML
8 SOLUTION TOPICAL
Qty: 1 | Refills: 2 | Status: DISCONTINUED | COMMUNITY
Start: 2022-05-26 | End: 2023-01-23

## 2023-01-23 RX ORDER — PETROLATUM,WHITE
OINTMENT (GRAM) OPHTHALMIC (EYE)
Refills: 0 | Status: DISCONTINUED | COMMUNITY
End: 2023-01-23

## 2023-01-23 RX ORDER — METHYLPREDNISOLONE 4 MG/1
4 TABLET ORAL
Qty: 1 | Refills: 2 | Status: DISCONTINUED | COMMUNITY
Start: 2022-11-22 | End: 2023-01-23

## 2023-01-23 RX ORDER — LIFITEGRAST 50 MG/ML
5 SOLUTION/ DROPS OPHTHALMIC
Qty: 1 | Refills: 0 | Status: DISCONTINUED | COMMUNITY
End: 2023-01-23

## 2023-01-23 RX ORDER — IBUPROFEN 600 MG/1
600 TABLET, FILM COATED ORAL
Qty: 40 | Refills: 0 | Status: DISCONTINUED | COMMUNITY
Start: 2020-01-09 | End: 2023-01-23

## 2023-01-23 RX ORDER — VALACYCLOVIR 1 G/1
1 TABLET, FILM COATED ORAL TWICE DAILY
Qty: 20 | Refills: 0 | Status: DISCONTINUED | COMMUNITY
Start: 2022-02-15 | End: 2023-01-23

## 2023-01-23 RX ORDER — OMEPRAZOLE 40 MG/1
40 CAPSULE, DELAYED RELEASE ORAL
Qty: 30 | Refills: 5 | Status: DISCONTINUED | COMMUNITY
Start: 2020-05-26 | End: 2023-01-23

## 2023-01-23 RX ORDER — FLUTICASONE PROPIONATE 50 UG/1
50 SPRAY, METERED NASAL DAILY
Qty: 1 | Refills: 1 | Status: DISCONTINUED | COMMUNITY
Start: 2019-10-23 | End: 2023-01-23

## 2023-01-23 RX ORDER — OFLOXACIN OTIC 3 MG/ML
0.3 SOLUTION AURICULAR (OTIC) TWICE DAILY
Qty: 1 | Refills: 0 | Status: DISCONTINUED | COMMUNITY
Start: 2022-08-12 | End: 2023-01-23

## 2023-01-23 RX ORDER — LEVOFLOXACIN 500 MG/1
500 TABLET, FILM COATED ORAL DAILY
Qty: 7 | Refills: 0 | Status: DISCONTINUED | COMMUNITY
Start: 2017-01-06 | End: 2023-01-23

## 2023-01-26 PROBLEM — G47.00 ACUTE INSOMNIA: Status: ACTIVE | Noted: 2019-09-05

## 2023-01-31 LAB
25(OH)D3 SERPL-MCNC: 16.9 NG/ML
ALBUMIN SERPL ELPH-MCNC: 4.3 G/DL
ALP BLD-CCNC: 75 U/L
ALT SERPL-CCNC: 22 U/L
ANA SER IF-ACNC: NEGATIVE
ANAPLASMA PHAGOCYTOPHILIA IGG ANTIBODIES: NEGATIVE
ANAPLASMA PHAGOCYTOPHILIA IGM ANTIBODIES: NEGATIVE
ANION GAP SERPL CALC-SCNC: 9 MMOL/L
APPEARANCE: CLEAR
AST SERPL-CCNC: 23 U/L
B BURGDOR AB SER-IMP: NEGATIVE
B BURGDOR IGG+IGM SER QL: 0.14 INDEX
BABESIA ANTIBODIES, IGG: NORMAL
BABESIA ANTIBODIES, IGM: NORMAL
BASOPHILS # BLD AUTO: 0.07 K/UL
BASOPHILS NFR BLD AUTO: 0.6 %
BILIRUB SERPL-MCNC: 0.3 MG/DL
BILIRUBIN URINE: NEGATIVE
BLOOD URINE: NEGATIVE
BUN SERPL-MCNC: 12 MG/DL
CALCIUM SERPL-MCNC: 9 MG/DL
CHLORIDE SERPL-SCNC: 104 MMOL/L
CHOLEST SERPL-MCNC: 185 MG/DL
CLAM IGE QN: <0.1 KUA/L
CO2 SERPL-SCNC: 26 MMOL/L
CODFISH IGE QN: <0.1 KUA/L
COLOR: YELLOW
CORN IGE QN: <0.1 KUA/L
COW MILK IGE QN: <0.1 KUA/L
CREAT SERPL-MCNC: 0.83 MG/DL
CREAT SPEC-SCNC: 109 MG/DL
DEPRECATED CLAM IGE RAST QL: 0
DEPRECATED CODFISH IGE RAST QL: 0
DEPRECATED CORN IGE RAST QL: 0
DEPRECATED COW MILK IGE RAST QL: 0
DEPRECATED EGG WHITE IGE RAST QL: 0
DEPRECATED GLUTEN IGE RAST QL: <0.1 KUA/L
DEPRECATED PEANUT IGE RAST QL: 0
DEPRECATED SCALLOP IGE RAST QL: 0.14 KUA/L
DEPRECATED SESAME SEED IGE RAST QL: 0
DEPRECATED SHRIMP IGE RAST QL: 0
DEPRECATED SOYBEAN IGE RAST QL: 0
DEPRECATED WALNUT IGE RAST QL: 0
DEPRECATED WHEAT IGE RAST QL: 0
EBV EA AB SER IA-ACNC: 9.8 U/ML
EBV EA AB TITR SER IF: NEGATIVE
EBV EA IGG SER QL IA: <3 U/ML
EBV EA IGG SER-ACNC: ABNORMAL
EBV EA IGM SER IA-ACNC: NEGATIVE
EBV PATRN SPEC IB-IMP: NORMAL
EBV VCA IGG SER IA-ACNC: 121 U/ML
EBV VCA IGM SER QL IA: <10 U/ML
EGFR: 89 ML/MIN/1.73M2
EGG WHITE IGE QN: <0.1 KUA/L
EHRLICIA CHAFFEENIS IGG ANTIBODIES: NEGATIVE
EHRLICIA CHAFFEENIS IGM ANTIBODIES: NEGATIVE
EOSINOPHIL # BLD AUTO: 0.21 K/UL
EOSINOPHIL NFR BLD AUTO: 1.8 %
EPSTEIN-BARR VIRUS CAPSID ANTIGEN IGG: POSITIVE
ERYTHROCYTE [SEDIMENTATION RATE] IN BLOOD BY WESTERGREN METHOD: 7 MM/HR
ESTIMATED AVERAGE GLUCOSE: 108 MG/DL
F. TULARENSIS AB, IGG: NEGATIVE
F. TULARENSIS AB, IGM: NEGATIVE
F. TULARENSIS INTERPRETATION: NORMAL
FERRITIN SERPL-MCNC: 62 NG/ML
FOLATE SERPL-MCNC: >20 NG/ML
GLUCOSE QUALITATIVE U: NEGATIVE
GLUCOSE SERPL-MCNC: 86 MG/DL
GLUTEN IGG QN: 0
HBA1C MFR BLD HPLC: 5.4 %
HCT VFR BLD CALC: 43.7 %
HDLC SERPL-MCNC: 63 MG/DL
HGB BLD-MCNC: 14.4 G/DL
IMM GRANULOCYTES NFR BLD AUTO: 0.3 %
KETONES URINE: NEGATIVE
LDLC SERPL CALC-MCNC: 106 MG/DL
LEUKOCYTE ESTERASE URINE: NEGATIVE
LYMPHOCYTES # BLD AUTO: 3.92 K/UL
LYMPHOCYTES NFR BLD AUTO: 33.4 %
MAN DIFF?: NORMAL
MCHC RBC-ENTMCNC: 31.5 PG
MCHC RBC-ENTMCNC: 33 GM/DL
MCV RBC AUTO: 95.6 FL
MICROALBUMIN 24H UR DL<=1MG/L-MCNC: <1.2 MG/DL
MICROALBUMIN/CREAT 24H UR-RTO: NORMAL MG/G
MONOCYTES # BLD AUTO: 0.64 K/UL
MONOCYTES NFR BLD AUTO: 5.5 %
NEUTROPHILS # BLD AUTO: 6.87 K/UL
NEUTROPHILS NFR BLD AUTO: 58.4 %
NITRITE URINE: NEGATIVE
NONHDLC SERPL-MCNC: 122 MG/DL
PEANUT IGE QN: <0.1 KUA/L
PH URINE: 5.5
PLATELET # BLD AUTO: 372 K/UL
POTASSIUM SERPL-SCNC: 4.4 MMOL/L
PROT SERPL-MCNC: 6.9 G/DL
PROTEIN URINE: NORMAL
R RICKETTSI IGG CSF-ACNC: NEGATIVE
R RICKETTSI IGM CSF-ACNC: 0.39 INDEX
RBC # BLD: 4.57 M/UL
RBC # FLD: 13.1 %
RHEUMATOID FACT SER QL: <10 IU/ML
SCALLOP IGE QN: <0.1 KUA/L
SCALLOP IGE QN: NORMAL
SESAME SEED IGE QN: <0.1 KUA/L
SODIUM SERPL-SCNC: 138 MMOL/L
SOYBEAN IGE QN: <0.1 KUA/L
SPECIFIC GRAVITY URINE: 1.02
T3RU NFR SERPL: 1.1 TBI
T4 FREE SERPL-MCNC: 1.2 NG/DL
TOTAL IGE SMQN RAST: 73 KU/L
TRIGL SERPL-MCNC: 81 MG/DL
TSH SERPL-ACNC: 1.4 UIU/ML
TTG IGA SER IA-ACNC: 1.2 U/ML
TTG IGA SER-ACNC: NEGATIVE
TTG IGG SER IA-ACNC: 9.6 U/ML
TTG IGG SER IA-ACNC: POSITIVE
UROBILINOGEN URINE: NORMAL
VIT B12 SERPL-MCNC: 431 PG/ML
WALNUT IGE QN: <0.1 KUA/L
WBC # FLD AUTO: 11.74 K/UL
WHEAT IGE QN: <0.1 KUA/L

## 2023-02-22 RX ORDER — CLOTRIMAZOLE AND BETAMETHASONE DIPROPIONATE 10; .5 MG/G; MG/G
1-0.05 CREAM TOPICAL TWICE DAILY
Qty: 1 | Refills: 2 | Status: ACTIVE | COMMUNITY
Start: 2023-02-22 | End: 1900-01-01

## 2023-03-06 ENCOUNTER — RX RENEWAL (OUTPATIENT)
Age: 43
End: 2023-03-06

## 2023-03-14 RX ORDER — ALBUTEROL SULFATE 90 UG/1
108 (90 BASE) INHALANT RESPIRATORY (INHALATION)
Qty: 3 | Refills: 3 | Status: ACTIVE | COMMUNITY
Start: 2022-11-22 | End: 1900-01-01

## 2023-04-25 ENCOUNTER — APPOINTMENT (OUTPATIENT)
Dept: FAMILY MEDICINE | Facility: CLINIC | Age: 43
End: 2023-04-25
Payer: COMMERCIAL

## 2023-04-25 ENCOUNTER — APPOINTMENT (OUTPATIENT)
Dept: ORTHOPEDIC SURGERY | Facility: CLINIC | Age: 43
End: 2023-04-25
Payer: COMMERCIAL

## 2023-04-25 ENCOUNTER — LABORATORY RESULT (OUTPATIENT)
Age: 43
End: 2023-04-25

## 2023-04-25 VITALS
DIASTOLIC BLOOD PRESSURE: 100 MMHG | SYSTOLIC BLOOD PRESSURE: 136 MMHG | BODY MASS INDEX: 29.99 KG/M2 | WEIGHT: 180 LBS | HEIGHT: 65 IN

## 2023-04-25 VITALS
RESPIRATION RATE: 16 BRPM | OXYGEN SATURATION: 98 % | SYSTOLIC BLOOD PRESSURE: 124 MMHG | DIASTOLIC BLOOD PRESSURE: 70 MMHG | TEMPERATURE: 98 F | BODY MASS INDEX: 29.99 KG/M2 | WEIGHT: 180 LBS | HEART RATE: 80 BPM | HEIGHT: 65 IN

## 2023-04-25 DIAGNOSIS — R10.9 UNSPECIFIED ABDOMINAL PAIN: ICD-10-CM

## 2023-04-25 DIAGNOSIS — Z00.00 ENCOUNTER FOR GENERAL ADULT MEDICAL EXAMINATION W/OUT ABNORMAL FINDINGS: ICD-10-CM

## 2023-04-25 DIAGNOSIS — F41.9 ANXIETY DISORDER, UNSPECIFIED: ICD-10-CM

## 2023-04-25 DIAGNOSIS — S83.242A OTHER TEAR OF MEDIAL MENISCUS, CURRENT INJURY, LEFT KNEE, INITIAL ENCOUNTER: ICD-10-CM

## 2023-04-25 PROCEDURE — 99204 OFFICE O/P NEW MOD 45 MIN: CPT

## 2023-04-25 PROCEDURE — 36415 COLL VENOUS BLD VENIPUNCTURE: CPT

## 2023-04-25 PROCEDURE — 99214 OFFICE O/P EST MOD 30 MIN: CPT | Mod: 25

## 2023-04-25 PROCEDURE — 73564 X-RAY EXAM KNEE 4 OR MORE: CPT | Mod: LT

## 2023-04-25 RX ORDER — FAMOTIDINE 40 MG/1
40 TABLET, FILM COATED ORAL DAILY
Qty: 90 | Refills: 3 | Status: ACTIVE | COMMUNITY
Start: 2023-04-25 | End: 1900-01-01

## 2023-04-25 NOTE — PHYSICAL EXAM
[de-identified] : The patient appears well nourished  and in no apparent distress.  The patient is alert and oriented to person, place, and time.   Affect and mood appear normal. The head is normocephalic and atraumatic.  The eyes reveal normal sclera and extra ocular muscles are intact. The tongue is midline with no apparent lesions.  Skin shows normal turgor with no evidence of eczema or psoriasis.  No respiratory distress noted.  Sensation grossly intact.		  [de-identified] : Exam of the left knee shows there is medial joint line tenderness.  \par 5/5 motor strength bilaterally distally. Sensation intact distally.		  [de-identified] : X-ray: 4 views of the left knee demonstrate well preserved joint spaces.

## 2023-04-25 NOTE — HISTORY OF PRESENT ILLNESS
[de-identified] : Ms. BALAJI RIVERA is a 42 year old female presenting for evaluation of left knee pain now worsening. Patient denies falls or trauma. She notes her pain is localized medially and is worse with all weightbearing activity including walking long distance and rising from a seated position. Patient notes her pain is also worse with deep flexion. She has a steroid injection in January 2023 without relief. She has been going to PT and taking Mobic without relief.

## 2023-04-25 NOTE — DISCUSSION/SUMMARY
[de-identified] : BALAJI RIVERA is a 42 year old female who presents with left knee pain and exam findings concerning for a medial meniscus tear. An MRI of the patient's left knee was ordered to evaluate for medial meniscus tear.  She had previous MRI report that showed no medial meniscus tear however her exam is very convincing of a possible meniscus tear and could have propagated since the prior MRI therefore think a new MRI is indicated at this time.  The patient will follow up after imaging is completed to review the results in the office.   \par \par She was advised that she may benefit from a left knee arthroscopy with partial meniscectomy. Risks, benefits and alternatives to surgery were discussed and all questions were answered. Recovery from surgery was also discussed. The percentages of success in an arthroscopy that involves a torn meniscus and arthritic changes is dependent upon how bad the arthritic changes are. Basically, removing a meniscal tear allows us to ascertain how bad the patient's articular cartilage destruction (arthritis) is. The arthroscopy cleans out any debris from the arthritic process as well as removing the meniscal tear. Approximately 75% of the patients will say that they feel relief, although their x-rays will continue to show significant arthritic changes. Arthroscopy for arthritis is a temporizing procedure, yielding subjective success (patient satisfaction) for less than two to five years. In some cases, the knee might eventually require a knee replacement for symptomatic relief. The prognostic factors that are somewhat favorable predictive values in arthroscopic debridements (removal of loose articular cartilage, loose body and inflamed synovium) of an arthritic knee are: short duration of symptoms, effusion (swelling), minimal deformity and good range of motion. The complications with any arthroscopy include the risk of anaesthetic complications and death, blood clots and pulmonary embolus, infection (less than 1%), nerve damage, by which we would mean a peroneal palsy (less than 0.1%) (small area of skin numbness is so common, we do not consider its presence a complication), injury to the popliteal artery, which is so rare that there are no statistics, but should it occur could theoretically lead to amputation, which is extremely unlikely. There is often a chance of getting a hemarthrosis (blood in the joint) but this usually resolves with local measures of icing, physical therapy, and aspiration. Reflex sympathetic dystrophy (RSD) is another extremely rare but theoretical complication. This (RSD) means that the patient has a stiff painful joint that is out of proportion to the objective pathology of the knee. Subsequently, it might require years of physical therapy before one regains a functional knee with RSD. Infrapatellar contracture syndrome (stiff joint) is sometimes reported and associated with RSD, but it usually is a result of not being aggressive in physical therapy. I think the patient understands the risk benefit ratio of arthroscopy and will think about whether they would prefer the nonoperative or surgical treatment option.\par

## 2023-04-25 NOTE — CONSULT LETTER
[Dear  ___] : Dear  [unfilled], [Consult Letter:] : I had the pleasure of evaluating your patient, [unfilled]. [Please see my note below.] : Please see my note below. [Consult Closing:] : Thank you very much for allowing me to participate in the care of this patient.  If you have any questions, please do not hesitate to contact me. [Sincerely,] : Sincerely, [FreeTextEntry2] : SOLOMON RIOS MD\par  [FreeTextEntry3] : Kvng Quiroz MD\par Chief of Joint Replacement\par [facility] \par [facility]\par \par

## 2023-04-25 NOTE — ADDENDUM
[FreeTextEntry1] : This note was authored by Dmitri Sanches working as a medical scribe for Dr. Kvng Quiroz. The note was reviewed, edited, and revised by Dr. Kvng Quiroz whom is in agreement with the exam findings, imaging findings, and treatment plan. 04/25/2023

## 2023-05-02 ENCOUNTER — APPOINTMENT (OUTPATIENT)
Dept: ORTHOPEDIC SURGERY | Facility: CLINIC | Age: 43
End: 2023-05-02
Payer: COMMERCIAL

## 2023-05-02 VITALS
WEIGHT: 185 LBS | DIASTOLIC BLOOD PRESSURE: 86 MMHG | HEART RATE: 84 BPM | HEIGHT: 65 IN | OXYGEN SATURATION: 99 % | BODY MASS INDEX: 30.82 KG/M2 | SYSTOLIC BLOOD PRESSURE: 144 MMHG

## 2023-05-02 DIAGNOSIS — M71.22 SYNOVIAL CYST OF POPLITEAL SPACE [BAKER], LEFT KNEE: ICD-10-CM

## 2023-05-02 LAB
25(OH)D3 SERPL-MCNC: 18.1 NG/ML
ALBUMIN SERPL ELPH-MCNC: 4 G/DL
ALP BLD-CCNC: 74 U/L
ALT SERPL-CCNC: 19 U/L
AMYLASE/CREAT SERPL: 57 U/L
ANA SER IF-ACNC: NEGATIVE
ANAPLASMA PHAGOCYTOPHILIA IGG ANTIBODIES: NEGATIVE
ANAPLASMA PHAGOCYTOPHILIA IGM ANTIBODIES: NEGATIVE
ANION GAP SERPL CALC-SCNC: 13 MMOL/L
APPEARANCE: CLEAR
AST SERPL-CCNC: 16 U/L
B BURGDOR AB SER-IMP: NEGATIVE
B BURGDOR IGG+IGM SER QL: 0.03 INDEX
BABESIA ANTIBODIES, IGG: NORMAL
BABESIA ANTIBODIES, IGM: NORMAL
BASOPHILS # BLD AUTO: 0.07 K/UL
BASOPHILS NFR BLD AUTO: 0.8 %
BILIRUB SERPL-MCNC: 0.4 MG/DL
BILIRUBIN URINE: NEGATIVE
BLOOD URINE: NEGATIVE
BUN SERPL-MCNC: 12 MG/DL
CALCIUM SERPL-MCNC: 9 MG/DL
CHLORIDE SERPL-SCNC: 103 MMOL/L
CHOLEST SERPL-MCNC: 192 MG/DL
CLAM IGE QN: <0.1 KUA/L
CO2 SERPL-SCNC: 21 MMOL/L
CODFISH IGE QN: <0.1 KUA/L
COLOR: YELLOW
CORN IGE QN: <0.1 KUA/L
COW MILK IGE QN: <0.1 KUA/L
CREAT SERPL-MCNC: 0.68 MG/DL
CREAT SPEC-SCNC: 71 MG/DL
DEPRECATED CLAM IGE RAST QL: 0
DEPRECATED CODFISH IGE RAST QL: 0
DEPRECATED CORN IGE RAST QL: 0
DEPRECATED COW MILK IGE RAST QL: 0
DEPRECATED EGG WHITE IGE RAST QL: 0
DEPRECATED GLUTEN IGE RAST QL: <0.1 KUA/L
DEPRECATED PEANUT IGE RAST QL: 0
DEPRECATED SCALLOP IGE RAST QL: <0.1 KUA/L
DEPRECATED SESAME SEED IGE RAST QL: 0
DEPRECATED SHRIMP IGE RAST QL: 0
DEPRECATED SOYBEAN IGE RAST QL: 0
DEPRECATED WALNUT IGE RAST QL: 0
DEPRECATED WHEAT IGE RAST QL: 0
EBV EA AB SER IA-ACNC: 11 U/ML
EBV EA AB TITR SER IF: NEGATIVE
EBV EA IGG SER QL IA: <3 U/ML
EBV EA IGG SER-ACNC: POSITIVE
EBV EA IGM SER IA-ACNC: NEGATIVE
EBV PATRN SPEC IB-IMP: NORMAL
EBV VCA IGG SER IA-ACNC: 136 U/ML
EBV VCA IGM SER QL IA: <10 U/ML
EGFR: 110 ML/MIN/1.73M2
EGG WHITE IGE QN: <0.1 KUA/L
EHRLICIA CHAFFEENIS IGG ANTIBODIES: NEGATIVE
EHRLICIA CHAFFEENIS IGM ANTIBODIES: NEGATIVE
EOSINOPHIL # BLD AUTO: 0.65 K/UL
EOSINOPHIL NFR BLD AUTO: 7.5 %
EPSTEIN-BARR VIRUS CAPSID ANTIGEN IGG: POSITIVE
ERYTHROCYTE [SEDIMENTATION RATE] IN BLOOD BY WESTERGREN METHOD: 24 MM/HR
ESTIMATED AVERAGE GLUCOSE: 108 MG/DL
F. TULARENSIS AB, IGG: NEGATIVE
F. TULARENSIS AB, IGM: NEGATIVE
F. TULARENSIS INTERPRETATION: NORMAL
FERRITIN SERPL-MCNC: 61 NG/ML
FOLATE SERPL-MCNC: 15.2 NG/ML
GLUCOSE QUALITATIVE U: NEGATIVE MG/DL
GLUCOSE SERPL-MCNC: 97 MG/DL
GLUTEN IGG QN: 0
HBA1C MFR BLD HPLC: 5.4 %
HCT VFR BLD CALC: 45.5 %
HDLC SERPL-MCNC: 54 MG/DL
HGB BLD-MCNC: 15 G/DL
IMM GRANULOCYTES NFR BLD AUTO: 0.2 %
KETONES URINE: NEGATIVE MG/DL
LDLC SERPL CALC-MCNC: 113 MG/DL
LEUKOCYTE ESTERASE URINE: NEGATIVE
LPL SERPL-CCNC: 27 U/L
LYMPHOCYTES # BLD AUTO: 2.56 K/UL
LYMPHOCYTES NFR BLD AUTO: 29.6 %
MAN DIFF?: NORMAL
MCHC RBC-ENTMCNC: 30.5 PG
MCHC RBC-ENTMCNC: 33 GM/DL
MCV RBC AUTO: 92.5 FL
MICROALBUMIN 24H UR DL<=1MG/L-MCNC: <1.2 MG/DL
MICROALBUMIN/CREAT 24H UR-RTO: NORMAL MG/G
MONOCYTES # BLD AUTO: 0.46 K/UL
MONOCYTES NFR BLD AUTO: 5.3 %
NEUTROPHILS # BLD AUTO: 4.89 K/UL
NEUTROPHILS NFR BLD AUTO: 56.6 %
NITRITE URINE: NEGATIVE
NONHDLC SERPL-MCNC: 138 MG/DL
PEANUT IGE QN: <0.1 KUA/L
PH URINE: 6
PLATELET # BLD AUTO: 356 K/UL
POTASSIUM SERPL-SCNC: 4.5 MMOL/L
PROT SERPL-MCNC: 6.6 G/DL
PROTEIN URINE: NEGATIVE MG/DL
R RICKETTSI IGG CSF-ACNC: NEGATIVE
R RICKETTSI IGM CSF-ACNC: 0.38 INDEX
RBC # BLD: 4.92 M/UL
RBC # FLD: 12.7 %
RHEUMATOID FACT SER QL: <10 IU/ML
SCALLOP IGE QN: 0
SCALLOP IGE QN: <0.1 KUA/L
SESAME SEED IGE QN: <0.1 KUA/L
SODIUM SERPL-SCNC: 137 MMOL/L
SOYBEAN IGE QN: <0.1 KUA/L
SPECIFIC GRAVITY URINE: 1.02
T3RU NFR SERPL: 1.1 TBI
T4 FREE SERPL-MCNC: 0.9 NG/DL
TOTAL IGE SMQN RAST: 116 KU/L
TRIGL SERPL-MCNC: 127 MG/DL
TSH SERPL-ACNC: 2.01 UIU/ML
TTG IGA SER IA-ACNC: <1.2 U/ML
TTG IGA SER-ACNC: NEGATIVE
TTG IGG SER IA-ACNC: 7.6 U/ML
TTG IGG SER IA-ACNC: ABNORMAL
UROBILINOGEN URINE: 0.2 MG/DL
VIT B12 SERPL-MCNC: 447 PG/ML
WALNUT IGE QN: <0.1 KUA/L
WBC # FLD AUTO: 8.65 K/UL
WHEAT IGE QN: <0.1 KUA/L

## 2023-05-02 PROCEDURE — 99213 OFFICE O/P EST LOW 20 MIN: CPT

## 2023-05-02 NOTE — PHYSICAL EXAM
[de-identified] : The patient appears well nourished  and in no apparent distress.  The patient is alert and oriented to person, place, and time.   Affect and mood appear normal. The head is normocephalic and atraumatic.  The eyes reveal normal sclera and extra ocular muscles are intact. The tongue is midline with no apparent lesions.  Skin shows normal turgor with no evidence of eczema or psoriasis.  No respiratory distress noted.  Sensation grossly intact.		  [de-identified] : Exam of the left knee shows medial joint line tenderness. She is nontender over the anterior horn lateral meniscus. There is a palpable Baker cyst. \par 5/5 motor strength bilaterally distally. Sensation intact distally.		  [de-identified] : Left Knee MRI Done By Pop Zelaya on 4/27/2023\par Impression and results from my independent review in office today: \par MRI of the left knee demonstrates features of a complex tear involving the anterior root anterior horn of lateral meniscus with adjacent subcentimeter parameniscal cyst with trace volume joint effusion decompressing into a partially ruptured approximately 4 cm in greatest dimension popliteal cyst.

## 2023-05-02 NOTE — DISCUSSION/SUMMARY
[de-identified] : BALAJI RIVERA is a 42 year old female who presents with a left knee lateral meniscus tear as seen on MRI. No medial meniscus tear was found on MRI. Her pain is localized medially and her lateral meniscus tear is asymptomatic at this time. Her pain is out of proportion to and not consistent with her anatomical abnormalities noted on MRI. A prescription for physical therapy was provided. The patient was also provided with an order to have her baker cyst aspirated under image guidance if enough fluid can be localized. She will follow up 6 weeks after her aspiration and PT.

## 2023-05-02 NOTE — ADDENDUM
[FreeTextEntry1] : This note was authored by Dmitri Sanches working as a medical scribe for Dr. Kvng Quiroz. The note was reviewed, edited, and revised by Dr. Kvng Quiroz whom is in agreement with the exam findings, imaging findings, and treatment plan. 05/02/2023

## 2023-05-04 ENCOUNTER — RESULT REVIEW (OUTPATIENT)
Age: 43
End: 2023-05-04

## 2023-05-09 ENCOUNTER — APPOINTMENT (OUTPATIENT)
Dept: ULTRASOUND IMAGING | Facility: CLINIC | Age: 43
End: 2023-05-09
Payer: COMMERCIAL

## 2023-05-09 ENCOUNTER — OUTPATIENT (OUTPATIENT)
Dept: OUTPATIENT SERVICES | Facility: HOSPITAL | Age: 43
LOS: 1 days | End: 2023-05-09
Payer: COMMERCIAL

## 2023-05-09 DIAGNOSIS — M71.22 SYNOVIAL CYST OF POPLITEAL SPACE [BAKER], LEFT KNEE: ICD-10-CM

## 2023-05-09 PROCEDURE — 20611 DRAIN/INJ JOINT/BURSA W/US: CPT

## 2023-05-09 PROCEDURE — 20611 DRAIN/INJ JOINT/BURSA W/US: CPT | Mod: LT

## 2023-06-13 ENCOUNTER — APPOINTMENT (OUTPATIENT)
Dept: ORTHOPEDIC SURGERY | Facility: CLINIC | Age: 43
End: 2023-06-13
Payer: COMMERCIAL

## 2023-06-13 VITALS
WEIGHT: 185 LBS | BODY MASS INDEX: 30.82 KG/M2 | DIASTOLIC BLOOD PRESSURE: 88 MMHG | SYSTOLIC BLOOD PRESSURE: 128 MMHG | HEART RATE: 90 BPM | HEIGHT: 65 IN | OXYGEN SATURATION: 100 %

## 2023-06-13 PROCEDURE — 99213 OFFICE O/P EST LOW 20 MIN: CPT

## 2023-06-13 NOTE — PHYSICAL EXAM
[de-identified] : The patient appears well nourished  and in no apparent distress.  The patient is alert and oriented to person, place, and time.   Affect and mood appear normal. The head is normocephalic and atraumatic.  The eyes reveal normal sclera and extra ocular muscles are intact. The tongue is midline with no apparent lesions.  Skin shows normal turgor with no evidence of eczema or psoriasis.  No respiratory distress noted.  Sensation grossly intact.		  [de-identified] : Exam of the left knee shows medial joint line tenderness. She is nontender over the anterior horn lateral meniscus. There is a palpable Baker cyst. \par 5/5 motor strength bilaterally distally. Sensation intact distally.		  [de-identified] : Left Knee MRI Done By Pop Zelaya on 4/27/2023\par Impression and results from my independent review in office today: \par MRI of the left knee demonstrates features of a complex tear involving the anterior root anterior horn of lateral meniscus with adjacent subcentimeter parameniscal cyst with trace volume joint effusion decompressing into a partially ruptured approximately 4 cm in greatest dimension popliteal cyst.

## 2023-06-13 NOTE — DISCUSSION/SUMMARY
[de-identified] : BALAJI RIVERA is a 42 year old female who presents with a left knee lateral meniscus tear as seen on MRI. No medial meniscus tear was found on MRI. Her pain is localized medially and her lateral meniscus tear is asymptomatic at this time. Her pain is out of proportion to and not consistent with her anatomical abnormalities noted on MRI. We discussed the option for repeat aspiration of the bakers cyst should this recur. I recommended a course of Celebrex. The patient was given a prescription for the Celebrex with directions. The patient was instructed to stop the medicine and call the office if there are any adverse reaction to the medicine. The patient was also instructed to consult with their primary care doctor prior to starting the medication. Follow up as needed.

## 2023-06-13 NOTE — HISTORY OF PRESENT ILLNESS
[de-identified] : Ms. BALAJI RIVERA is a 42 year old female presenting for evaluation of left knee pain now worsening. Patient denies falls or trauma. She notes her pain is localized medially and is worse with all weightbearing activity including walking long distance and rising from a seated position. Patient notes her pain is also worse with deep flexion. She has a steroid injection in January 2023 without relief. She has been going to PT and taking Mobic without relief. Patient had an MRI which revealed LATERAL meniscus tears and a bakers cyst. She went for a bakers cyst aspiration and injection with steroid in early May. Patient reports some improvement with this.

## 2023-07-18 ENCOUNTER — RX RENEWAL (OUTPATIENT)
Age: 43
End: 2023-07-18

## 2023-07-24 ENCOUNTER — RX RENEWAL (OUTPATIENT)
Age: 43
End: 2023-07-24

## 2023-08-18 ENCOUNTER — RX RENEWAL (OUTPATIENT)
Age: 43
End: 2023-08-18

## 2023-08-20 ENCOUNTER — RX RENEWAL (OUTPATIENT)
Age: 43
End: 2023-08-20

## 2023-09-29 ENCOUNTER — RX RENEWAL (OUTPATIENT)
Age: 43
End: 2023-09-29

## 2023-10-10 ENCOUNTER — LABORATORY RESULT (OUTPATIENT)
Age: 43
End: 2023-10-10

## 2023-10-10 ENCOUNTER — APPOINTMENT (OUTPATIENT)
Dept: FAMILY MEDICINE | Facility: CLINIC | Age: 43
End: 2023-10-10
Payer: COMMERCIAL

## 2023-10-10 VITALS
DIASTOLIC BLOOD PRESSURE: 83 MMHG | HEART RATE: 97 BPM | TEMPERATURE: 98.3 F | BODY MASS INDEX: 30.82 KG/M2 | WEIGHT: 185 LBS | OXYGEN SATURATION: 99 % | SYSTOLIC BLOOD PRESSURE: 129 MMHG | HEIGHT: 65 IN

## 2023-10-10 DIAGNOSIS — J30.2 OTHER SEASONAL ALLERGIC RHINITIS: ICD-10-CM

## 2023-10-10 DIAGNOSIS — R52 PAIN, UNSPECIFIED: ICD-10-CM

## 2023-10-10 DIAGNOSIS — R21 RASH AND OTHER NONSPECIFIC SKIN ERUPTION: ICD-10-CM

## 2023-10-10 DIAGNOSIS — K21.9 GASTRO-ESOPHAGEAL REFLUX DISEASE W/OUT ESOPHAGITIS: ICD-10-CM

## 2023-10-10 PROCEDURE — 99214 OFFICE O/P EST MOD 30 MIN: CPT

## 2023-10-10 RX ORDER — MELOXICAM 15 MG/1
15 TABLET ORAL
Qty: 90 | Refills: 3 | Status: ACTIVE | COMMUNITY
Start: 2023-10-10 | End: 1900-01-01

## 2023-10-11 PROBLEM — R52 BODY ACHES: Status: ACTIVE | Noted: 2017-10-23

## 2023-10-11 PROBLEM — J30.2 SEASONAL ALLERGIES: Status: ACTIVE | Noted: 2020-04-28

## 2023-10-15 LAB
25(OH)D3 SERPL-MCNC: 27 NG/ML
ALBUMIN SERPL ELPH-MCNC: 4.5 G/DL
ALP BLD-CCNC: 84 U/L
ALT SERPL-CCNC: 19 U/L
ANA SER IF-ACNC: NEGATIVE
ANAPLASMA PHAGOCYTOPHILIA IGG ANTIBODIES: NEGATIVE
ANAPLASMA PHAGOCYTOPHILIA IGM ANTIBODIES: NEGATIVE
ANION GAP SERPL CALC-SCNC: 16 MMOL/L
APPEARANCE: CLEAR
AST SERPL-CCNC: 20 U/L
B BURGDOR AB SER-IMP: NEGATIVE
B BURGDOR IGG+IGM SER QL: 0.11 INDEX
BASOPHILS # BLD AUTO: 0.07 K/UL
BASOPHILS NFR BLD AUTO: 0.6 %
BILIRUB SERPL-MCNC: 0.4 MG/DL
BILIRUBIN URINE: NEGATIVE
BLOOD URINE: NEGATIVE
BUN SERPL-MCNC: 14 MG/DL
C TRACH RRNA SPEC QL NAA+PROBE: NOT DETECTED
CALCIUM SERPL-MCNC: 9.4 MG/DL
CHLORIDE SERPL-SCNC: 105 MMOL/L
CHOLEST SERPL-MCNC: 217 MG/DL
CLAM IGE QN: <0.1 KUA/L
CO2 SERPL-SCNC: 20 MMOL/L
CODFISH IGE QN: <0.1 KUA/L
COLOR: YELLOW
CORN IGE QN: <0.1 KUA/L
COW MILK IGE QN: <0.1 KUA/L
CREAT SERPL-MCNC: 0.75 MG/DL
CREAT SPEC-SCNC: 174 MG/DL
DEPRECATED CLAM IGE RAST QL: 0
DEPRECATED CODFISH IGE RAST QL: 0
DEPRECATED CORN IGE RAST QL: 0
DEPRECATED COW MILK IGE RAST QL: 0
DEPRECATED EGG WHITE IGE RAST QL: 0
DEPRECATED GLUTEN IGE RAST QL: <0.1 KUA/L
DEPRECATED PEANUT IGE RAST QL: 0
DEPRECATED SCALLOP IGE RAST QL: <0.1 KUA/L
DEPRECATED SESAME SEED IGE RAST QL: 0
DEPRECATED SHRIMP IGE RAST QL: 0
DEPRECATED SOYBEAN IGE RAST QL: 0
DEPRECATED WALNUT IGE RAST QL: 0
DEPRECATED WHEAT IGE RAST QL: 0
EBV EA AB SER IA-ACNC: 10.2 U/ML
EBV EA AB TITR SER IF: NEGATIVE
EBV EA IGG SER QL IA: <3 U/ML
EBV EA IGG SER-ACNC: ABNORMAL
EBV EA IGM SER IA-ACNC: NEGATIVE
EBV PATRN SPEC IB-IMP: NORMAL
EBV VCA IGG SER IA-ACNC: 124 U/ML
EBV VCA IGM SER QL IA: <10 U/ML
EGFR: 100 ML/MIN/1.73M2
EGG WHITE IGE QN: <0.1 KUA/L
EHRLICIA CHAFFEENIS IGG ANTIBODIES: NEGATIVE
EHRLICIA CHAFFEENIS IGM ANTIBODIES: NEGATIVE
EOSINOPHIL # BLD AUTO: 0.32 K/UL
EOSINOPHIL NFR BLD AUTO: 2.9 %
EPSTEIN-BARR VIRUS CAPSID ANTIGEN IGG: POSITIVE
ERHLICIA RESULT COMMENT: NORMAL
ERYTHROCYTE [SEDIMENTATION RATE] IN BLOOD BY WESTERGREN METHOD: 10 MM/HR
ESTIMATED AVERAGE GLUCOSE: 114 MG/DL
F. TULARENSIS AB, IGG: NEGATIVE
F. TULARENSIS AB, IGM: NEGATIVE
F. TULARENSIS INTERPRETATION: NORMAL
FERRITIN SERPL-MCNC: 61 NG/ML
FOLATE SERPL-MCNC: 15.2 NG/ML
FT4I SERPL CALC-MCNC: 6.4 INDEX
GLUCOSE QUALITATIVE U: NEGATIVE MG/DL
GLUCOSE SERPL-MCNC: 120 MG/DL
GLUTEN IGG QN: 0
HAV IGM SER QL: NONREACTIVE
HBA1C MFR BLD HPLC: 5.6 %
HBV CORE IGM SER QL: NONREACTIVE
HBV SURFACE AG SER QL: NONREACTIVE
HCT VFR BLD CALC: 50.6 %
HCV AB SER QL: NONREACTIVE
HCV S/CO RATIO: 0.13 S/CO
HDLC SERPL-MCNC: 56 MG/DL
HGB BLD-MCNC: 15.7 G/DL
HIV1+2 AB SPEC QL IA.RAPID: NONREACTIVE
HSV 1+2 IGG SER IA-IMP: NEGATIVE
HSV1 IGG SER QL: <0.01 INDEX
IMM GRANULOCYTES NFR BLD AUTO: 0.3 %
KETONES URINE: NEGATIVE MG/DL
LDLC SERPL CALC-MCNC: 141 MG/DL
LEUKOCYTE ESTERASE URINE: ABNORMAL
LYMPHOCYTES # BLD AUTO: 2.66 K/UL
LYMPHOCYTES NFR BLD AUTO: 24 %
MAN DIFF?: NORMAL
MCHC RBC-ENTMCNC: 30.7 PG
MCHC RBC-ENTMCNC: 31 GM/DL
MCV RBC AUTO: 98.8 FL
MICROALBUMIN 24H UR DL<=1MG/L-MCNC: <1.2 MG/DL
MICROALBUMIN/CREAT 24H UR-RTO: NORMAL MG/G
MONOCYTES # BLD AUTO: 0.33 K/UL
MONOCYTES NFR BLD AUTO: 3 %
N GONORRHOEA RRNA SPEC QL NAA+PROBE: NOT DETECTED
NEUTROPHILS # BLD AUTO: 7.69 K/UL
NEUTROPHILS NFR BLD AUTO: 69.2 %
NITRITE URINE: NEGATIVE
NONHDLC SERPL-MCNC: 161 MG/DL
PEANUT IGE QN: <0.1 KUA/L
PH URINE: 5.5
PLATELET # BLD AUTO: 382 K/UL
POTASSIUM SERPL-SCNC: 4.2 MMOL/L
PROT SERPL-MCNC: 7.4 G/DL
PROTEIN URINE: NEGATIVE MG/DL
R RICKETTSI IGG CSF-ACNC: NEGATIVE
R RICKETTSI IGM CSF-ACNC: 0.5 INDEX
RBC # BLD: 5.12 M/UL
RBC # FLD: 14.2 %
RHEUMATOID FACT SER QL: <10 IU/ML
RPR SER-TITR: NORMAL
SCALLOP IGE QN: 0
SCALLOP IGE QN: <0.1 KUA/L
SESAME SEED IGE QN: <0.1 KUA/L
SODIUM SERPL-SCNC: 142 MMOL/L
SOURCE AMPLIFICATION: NORMAL
SOYBEAN IGE QN: <0.1 KUA/L
SPECIFIC GRAVITY URINE: 1.03
T3 SERPL-MCNC: 142 NG/DL
T3FREE SERPL-MCNC: 2.95 PG/ML
T3REVERSE SERPL-MCNC: 7.7 NG/DL
T3RU NFR SERPL: 1.1 TBI
T4 FREE SERPL-MCNC: 1 NG/DL
T4 SERPL-MCNC: 7.3 UG/DL
TOTAL IGE SMQN RAST: 121 KU/L
TRIGL SERPL-MCNC: 113 MG/DL
TSH RECEPTOR AB: <1.1 IU/L
TSH SERPL-ACNC: 2.25 UIU/ML
TSI ACT/NOR SER: <0.1 IU/L
TTG IGA SER IA-ACNC: <1.2 U/ML
TTG IGA SER-ACNC: NEGATIVE
TTG IGG SER IA-ACNC: 6.9 U/ML
TTG IGG SER IA-ACNC: ABNORMAL
UROBILINOGEN URINE: 0.2 MG/DL
VIT B12 SERPL-MCNC: 560 PG/ML
WALNUT IGE QN: <0.1 KUA/L
WBC # FLD AUTO: 11.1 K/UL
WHEAT IGE QN: <0.1 KUA/L

## 2023-10-16 RX ORDER — DIAZEPAM 2 MG/1
2 TABLET ORAL
Qty: 30 | Refills: 0 | Status: ACTIVE | COMMUNITY
Start: 2018-12-21 | End: 1900-01-01

## 2023-10-17 LAB
BABESIA ANTIBODIES, IGG: NORMAL
BABESIA ANTIBODIES, IGM: NORMAL
BABESIA RESULT COMMENT: NORMAL

## 2023-10-31 ENCOUNTER — APPOINTMENT (OUTPATIENT)
Dept: ORTHOPEDIC SURGERY | Facility: CLINIC | Age: 43
End: 2023-10-31

## 2023-11-08 ENCOUNTER — APPOINTMENT (OUTPATIENT)
Dept: ORTHOPEDIC SURGERY | Facility: CLINIC | Age: 43
End: 2023-11-08
Payer: COMMERCIAL

## 2023-11-08 VITALS
DIASTOLIC BLOOD PRESSURE: 72 MMHG | HEIGHT: 65 IN | SYSTOLIC BLOOD PRESSURE: 128 MMHG | WEIGHT: 184 LBS | HEART RATE: 84 BPM | OXYGEN SATURATION: 99 % | BODY MASS INDEX: 30.66 KG/M2

## 2023-11-08 PROCEDURE — 99214 OFFICE O/P EST MOD 30 MIN: CPT

## 2023-11-08 PROCEDURE — 73564 X-RAY EXAM KNEE 4 OR MORE: CPT | Mod: 26,LT

## 2023-11-11 ENCOUNTER — RX RENEWAL (OUTPATIENT)
Age: 43
End: 2023-11-11

## 2023-11-14 DIAGNOSIS — E04.2 NONTOXIC MULTINODULAR GOITER: ICD-10-CM

## 2023-12-07 ENCOUNTER — RX RENEWAL (OUTPATIENT)
Age: 43
End: 2023-12-07

## 2023-12-11 ENCOUNTER — APPOINTMENT (OUTPATIENT)
Dept: ORTHOPEDIC SURGERY | Facility: CLINIC | Age: 43
End: 2023-12-11

## 2023-12-13 ENCOUNTER — RX RENEWAL (OUTPATIENT)
Age: 43
End: 2023-12-13

## 2023-12-13 RX ORDER — CLOTRIMAZOLE AND BETAMETHASONE DIPROPIONATE 10; .5 MG/G; MG/G
1-0.05 CREAM TOPICAL TWICE DAILY
Qty: 45 | Refills: 0 | Status: ACTIVE | COMMUNITY
Start: 2019-05-07 | End: 1900-01-01

## 2023-12-14 ENCOUNTER — APPOINTMENT (OUTPATIENT)
Dept: ORTHOPEDIC SURGERY | Facility: CLINIC | Age: 43
End: 2023-12-14

## 2024-01-05 ENCOUNTER — RX RENEWAL (OUTPATIENT)
Age: 44
End: 2024-01-05

## 2024-01-17 ENCOUNTER — APPOINTMENT (OUTPATIENT)
Dept: ORTHOPEDIC SURGERY | Facility: CLINIC | Age: 44
End: 2024-01-17

## 2024-01-25 ENCOUNTER — RX RENEWAL (OUTPATIENT)
Age: 44
End: 2024-01-25

## 2024-01-31 ENCOUNTER — APPOINTMENT (OUTPATIENT)
Dept: ORTHOPEDIC SURGERY | Facility: CLINIC | Age: 44
End: 2024-01-31
Payer: COMMERCIAL

## 2024-01-31 VITALS
HEIGHT: 65 IN | BODY MASS INDEX: 30.66 KG/M2 | WEIGHT: 184 LBS | HEART RATE: 78 BPM | OXYGEN SATURATION: 100 % | SYSTOLIC BLOOD PRESSURE: 128 MMHG | DIASTOLIC BLOOD PRESSURE: 70 MMHG

## 2024-01-31 PROCEDURE — 20611 DRAIN/INJ JOINT/BURSA W/US: CPT | Mod: LT

## 2024-01-31 PROCEDURE — 99213 OFFICE O/P EST LOW 20 MIN: CPT | Mod: 25

## 2024-01-31 NOTE — PROCEDURE
[de-identified] : Using sterile technique, 2cc of depomedrol 40mg/ml, 4cc of 1% plain lidocaine, and 2 cc 0.25% marcaine was drawn up into a sterile syringe. The left knee joint space was identified using ultrasound. The left knee was then sterilely prepped with chlorhexidine. Ethyl chloride spray was used to anesthetize the skin and subQ tissue. The depomedrol/lidocaine/marcaine mixture was then injected into the knee joint in the superolateral position under ultrasound guidance and the needle position in the joint space was confirmed. The patient tolerated the procedure well without difficulty. The patient was given instructions on the use of ice and anti-inflammatories post injection site soreness.

## 2024-01-31 NOTE — HISTORY OF PRESENT ILLNESS
[de-identified] : Ms. BALAJI RIVERA is a 42 year old female presenting for evaluation of left knee pain now worsening. Patient denies falls or trauma. She notes her pain is localized laterally and is worse with all weightbearing activity including walking long distance and rising from a seated position. Patient notes her pain is also worse with deep flexion. She has a steroid injection in January 2023 without relief. She has been going to PT and taking Mobic without relief.  She feels that her knee pain is now worsening. Patient went for a recent MRI to evaluate for worsening of the lateral meniscus tear. She presents for follow up.

## 2024-01-31 NOTE — PHYSICAL EXAM
[de-identified] : The patient appears well nourished  and in no apparent distress.  The patient is alert and oriented to person, place, and time.   Affect and mood appear normal. The head is normocephalic and atraumatic.  The eyes reveal normal sclera and extra ocular muscles are intact. The tongue is midline with no apparent lesions.  Skin shows normal turgor with no evidence of eczema or psoriasis.  No respiratory distress noted.  Sensation grossly intact.		  [de-identified] : Exam of the left knee shows lateral joint line tenderness. There is a palpable Baker cyst. ROM 0-120 with pain at deep flexion. Positive Mcmurrays.   5/5 motor strength bilaterally distally. Sensation intact distally.		  [de-identified] : Prior Xray 4 views left knee demonstrate well preserved joint spaces.   Left Knee MRI Done By Pop Zelaya on 11/29/2023 No significant interval change since April with partial lateral meniscus tear. medial left gastrocnemius origin tendinosis.

## 2024-01-31 NOTE — DISCUSSION/SUMMARY
[de-identified] : BALAJI RIVERA is a 43 year old female who presents with a left knee lateral meniscus tear. Conservative options were discussed. Patient received a steroid injection and tolerated well. She declined a PT script. Follow up in 6 weeks.

## 2024-03-26 ENCOUNTER — APPOINTMENT (OUTPATIENT)
Dept: ORTHOPEDIC SURGERY | Facility: CLINIC | Age: 44
End: 2024-03-26
Payer: COMMERCIAL

## 2024-03-26 VITALS
BODY MASS INDEX: 30.49 KG/M2 | WEIGHT: 183 LBS | DIASTOLIC BLOOD PRESSURE: 91 MMHG | SYSTOLIC BLOOD PRESSURE: 138 MMHG | HEIGHT: 65 IN | HEART RATE: 87 BPM

## 2024-03-26 PROCEDURE — 99214 OFFICE O/P EST MOD 30 MIN: CPT

## 2024-03-26 RX ORDER — BUTALB/ACETAMINOPHEN/CAFFEINE 50-325-40
TABLET ORAL
Refills: 0 | Status: ACTIVE | COMMUNITY

## 2024-03-26 NOTE — DISCUSSION/SUMMARY
[de-identified] : BALAJI RIVERA is a 43 year old female who presents with a left knee lateral meniscus tear as seen on MRI. A cortisone injection in the knee failed to provide adequate pain relief and her pain is limiting the quality of her life at this point. She was advised that she may benefit from a left knee arthroscopy with partial lateral meniscectomy. Risks, benefits and alternatives to surgery were discussed and all questions were answered. Recovery from surgery was also discussed. The percentages of success in an arthroscopy that involves a torn meniscus and arthritic changes is dependent upon how bad the arthritic changes are. Basically, removing a meniscal tear allows us to ascertain how bad the patient's articular cartilage destruction (arthritis) is. The arthroscopy cleans out any debris from the arthritic process as well as removing the meniscal tear. Approximately 75% of the patients will say that they feel relief, although their x-rays will continue to show significant arthritic changes. Arthroscopy for arthritis is a temporizing procedure, yielding subjective success (patient satisfaction) for less than two to five years. In some cases, the knee might eventually require a knee replacement for symptomatic relief. The prognostic factors that are somewhat favorable predictive values in arthroscopic debridements (removal of loose articular cartilage, loose body and inflamed synovium) of an arthritic knee are: short duration of symptoms, effusion (swelling), minimal deformity and good range of motion. The complications with any arthroscopy include the risk of anaesthetic complications and death, blood clots and pulmonary embolus, infection (less than 1%), nerve damage, by which we would mean a peroneal palsy (less than 0.1%) (small area of skin numbness is so common, we do not consider its presence a complication), injury to the popliteal artery, which is so rare that there are no statistics, but should it occur could theoretically lead to amputation, which is extremely unlikely. There is often a chance of getting a hemarthrosis (blood in the joint) but this usually resolves with local measures of icing, physical therapy, and aspiration. Reflex sympathetic dystrophy (RSD) is another extremely rare but theoretical complication. This (RSD) means that the patient has a stiff painful joint that is out of proportion to the objective pathology of the knee. Subsequently, it might require years of physical therapy before one regains a functional knee with RSD. Infrapatellar contracture syndrome (stiff joint) is sometimes reported and associated with RSD, but it usually is a result of not being aggressive in physical therapy. I think the patient understands the risk benefit ratio of arthroscopy and will think about whether they would prefer the nonoperative or surgical treatment option.

## 2024-03-26 NOTE — HISTORY OF PRESENT ILLNESS
[de-identified] : BALAJI RIVERA is a 43 year old female who presents with left knee pain. The patient was previously diagnosed with a lateral meniscus tear via and recent left knee MRI. To treat this at her last visit in office, the patient received a cortisone injection in her left knee. This injection failed to provide adequate pain relief. The patient's left knee pain is now limiting her quality of life and is negatively impacting her ability to perform ADL's as desired. She presents today to follow up, hopeful to discuss definitive treatment of her lateral meniscus tear.

## 2024-03-26 NOTE — ADDENDUM
[FreeTextEntry1] : This note was authored by Dmitri Sanches working as a medical scribe for Dr. Kvng Quiroz. The note was reviewed, edited, and revised by Dr. Kvng Quiroz whom is in agreement with the exam findings, imaging findings, and treatment plan. 03/26/2024

## 2024-03-28 ENCOUNTER — RX RENEWAL (OUTPATIENT)
Age: 44
End: 2024-03-28

## 2024-04-04 ENCOUNTER — OUTPATIENT (OUTPATIENT)
Dept: OUTPATIENT SERVICES | Facility: HOSPITAL | Age: 44
LOS: 1 days | End: 2024-04-04
Payer: COMMERCIAL

## 2024-04-04 DIAGNOSIS — Z01.818 ENCOUNTER FOR OTHER PREPROCEDURAL EXAMINATION: ICD-10-CM

## 2024-04-04 LAB
A1C WITH ESTIMATED AVERAGE GLUCOSE RESULT: 5.4 % — SIGNIFICANT CHANGE UP (ref 4–5.6)
ANION GAP SERPL CALC-SCNC: 9 MMOL/L — SIGNIFICANT CHANGE UP (ref 5–17)
BASOPHILS # BLD AUTO: 0.08 K/UL — SIGNIFICANT CHANGE UP (ref 0–0.2)
BASOPHILS NFR BLD AUTO: 0.9 % — SIGNIFICANT CHANGE UP (ref 0–2)
BUN SERPL-MCNC: 10 MG/DL — SIGNIFICANT CHANGE UP (ref 8–20)
CALCIUM SERPL-MCNC: 8.8 MG/DL — SIGNIFICANT CHANGE UP (ref 8.4–10.5)
CHLORIDE SERPL-SCNC: 99 MMOL/L — SIGNIFICANT CHANGE UP (ref 96–108)
CO2 SERPL-SCNC: 27 MMOL/L — SIGNIFICANT CHANGE UP (ref 22–29)
CREAT SERPL-MCNC: 0.72 MG/DL — SIGNIFICANT CHANGE UP (ref 0.5–1.3)
EGFR: 105 ML/MIN/1.73M2 — SIGNIFICANT CHANGE UP
EOSINOPHIL # BLD AUTO: 0.31 K/UL — SIGNIFICANT CHANGE UP (ref 0–0.5)
EOSINOPHIL NFR BLD AUTO: 3.6 % — SIGNIFICANT CHANGE UP (ref 0–6)
ESTIMATED AVERAGE GLUCOSE: 108 MG/DL — SIGNIFICANT CHANGE UP (ref 68–114)
GLUCOSE SERPL-MCNC: 86 MG/DL — SIGNIFICANT CHANGE UP (ref 70–99)
HCG SERPL-ACNC: <4 MIU/ML — SIGNIFICANT CHANGE UP
HCT VFR BLD CALC: 41.9 % — SIGNIFICANT CHANGE UP (ref 34.5–45)
HGB BLD-MCNC: 14.1 G/DL — SIGNIFICANT CHANGE UP (ref 11.5–15.5)
IMM GRANULOCYTES NFR BLD AUTO: 0.2 % — SIGNIFICANT CHANGE UP (ref 0–0.9)
LYMPHOCYTES # BLD AUTO: 3.08 K/UL — SIGNIFICANT CHANGE UP (ref 1–3.3)
LYMPHOCYTES # BLD AUTO: 35.9 % — SIGNIFICANT CHANGE UP (ref 13–44)
MCHC RBC-ENTMCNC: 30.5 PG — SIGNIFICANT CHANGE UP (ref 27–34)
MCHC RBC-ENTMCNC: 33.7 GM/DL — SIGNIFICANT CHANGE UP (ref 32–36)
MCV RBC AUTO: 90.5 FL — SIGNIFICANT CHANGE UP (ref 80–100)
MONOCYTES # BLD AUTO: 0.5 K/UL — SIGNIFICANT CHANGE UP (ref 0–0.9)
MONOCYTES NFR BLD AUTO: 5.8 % — SIGNIFICANT CHANGE UP (ref 2–14)
NEUTROPHILS # BLD AUTO: 4.58 K/UL — SIGNIFICANT CHANGE UP (ref 1.8–7.4)
NEUTROPHILS NFR BLD AUTO: 53.6 % — SIGNIFICANT CHANGE UP (ref 43–77)
PLATELET # BLD AUTO: 337 K/UL — SIGNIFICANT CHANGE UP (ref 150–400)
POTASSIUM SERPL-MCNC: 4.3 MMOL/L — SIGNIFICANT CHANGE UP (ref 3.5–5.3)
POTASSIUM SERPL-SCNC: 4.3 MMOL/L — SIGNIFICANT CHANGE UP (ref 3.5–5.3)
RBC # BLD: 4.63 M/UL — SIGNIFICANT CHANGE UP (ref 3.8–5.2)
RBC # FLD: 13.2 % — SIGNIFICANT CHANGE UP (ref 10.3–14.5)
SODIUM SERPL-SCNC: 135 MMOL/L — SIGNIFICANT CHANGE UP (ref 135–145)
WBC # BLD: 8.57 K/UL — SIGNIFICANT CHANGE UP (ref 3.8–10.5)
WBC # FLD AUTO: 8.57 K/UL — SIGNIFICANT CHANGE UP (ref 3.8–10.5)

## 2024-04-04 PROCEDURE — 71046 X-RAY EXAM CHEST 2 VIEWS: CPT | Mod: 26

## 2024-04-04 PROCEDURE — 93005 ELECTROCARDIOGRAM TRACING: CPT

## 2024-04-04 PROCEDURE — 83036 HEMOGLOBIN GLYCOSYLATED A1C: CPT

## 2024-04-04 PROCEDURE — 93010 ELECTROCARDIOGRAM REPORT: CPT

## 2024-04-04 PROCEDURE — G0463: CPT

## 2024-04-04 PROCEDURE — 71046 X-RAY EXAM CHEST 2 VIEWS: CPT

## 2024-04-04 PROCEDURE — 36415 COLL VENOUS BLD VENIPUNCTURE: CPT

## 2024-04-04 PROCEDURE — 84702 CHORIONIC GONADOTROPIN TEST: CPT

## 2024-04-04 PROCEDURE — 80048 BASIC METABOLIC PNL TOTAL CA: CPT

## 2024-04-04 PROCEDURE — 85025 COMPLETE CBC W/AUTO DIFF WBC: CPT

## 2024-04-15 ENCOUNTER — APPOINTMENT (OUTPATIENT)
Dept: FAMILY MEDICINE | Facility: CLINIC | Age: 44
End: 2024-04-15
Payer: COMMERCIAL

## 2024-04-15 VITALS
TEMPERATURE: 98.4 F | DIASTOLIC BLOOD PRESSURE: 92 MMHG | SYSTOLIC BLOOD PRESSURE: 141 MMHG | HEART RATE: 91 BPM | RESPIRATION RATE: 16 BRPM | OXYGEN SATURATION: 97 %

## 2024-04-15 DIAGNOSIS — Z01.818 ENCOUNTER FOR OTHER PREPROCEDURAL EXAMINATION: ICD-10-CM

## 2024-04-15 DIAGNOSIS — M25.562 PAIN IN LEFT KNEE: ICD-10-CM

## 2024-04-15 DIAGNOSIS — F17.200 NICOTINE DEPENDENCE, UNSPECIFIED, UNCOMPLICATED: ICD-10-CM

## 2024-04-15 PROCEDURE — 99215 OFFICE O/P EST HI 40 MIN: CPT

## 2024-04-15 PROCEDURE — G2211 COMPLEX E/M VISIT ADD ON: CPT

## 2024-04-15 RX ORDER — NICOTINE POLACRILEX 2 MG/1
2 LOZENGE ORAL
Qty: 1 | Refills: 1 | Status: ACTIVE | COMMUNITY
Start: 2024-04-15 | End: 1900-01-01

## 2024-04-15 RX ORDER — VARENICLINE TARTRATE 0.5 (11)-1
0.5 MG X 11 & KIT ORAL
Qty: 1 | Refills: 0 | Status: ACTIVE | COMMUNITY
Start: 2024-04-15 | End: 1900-01-01

## 2024-04-16 PROBLEM — M25.562 ACUTE PAIN OF LEFT KNEE: Status: ACTIVE | Noted: 2019-09-05

## 2024-04-16 PROBLEM — Z01.818 PREOPERATIVE CLEARANCE: Status: ACTIVE | Noted: 2018-07-11

## 2024-04-17 RX ORDER — OXYCODONE AND ACETAMINOPHEN 5; 325 MG/1; MG/1
5-325 TABLET ORAL
Qty: 42 | Refills: 0 | Status: ACTIVE | COMMUNITY
Start: 2024-04-17 | End: 1900-01-01

## 2024-04-18 ENCOUNTER — APPOINTMENT (OUTPATIENT)
Dept: ORTHOPEDIC SURGERY | Facility: AMBULATORY SURGERY CENTER | Age: 44
End: 2024-04-18
Payer: COMMERCIAL

## 2024-04-18 PROCEDURE — 29881 ARTHRS KNE SRG MNISECTMY M/L: CPT | Mod: LT

## 2024-04-19 ENCOUNTER — NON-APPOINTMENT (OUTPATIENT)
Age: 44
End: 2024-04-19

## 2024-04-26 RX ORDER — METHYLPREDNISOLONE 4 MG/1
4 TABLET ORAL
Qty: 1 | Refills: 0 | Status: ACTIVE | COMMUNITY
Start: 2024-04-26 | End: 1900-01-01

## 2024-04-30 ENCOUNTER — APPOINTMENT (OUTPATIENT)
Dept: ORTHOPEDIC SURGERY | Facility: CLINIC | Age: 44
End: 2024-04-30
Payer: COMMERCIAL

## 2024-04-30 VITALS
OXYGEN SATURATION: 98 % | HEART RATE: 84 BPM | WEIGHT: 183 LBS | HEIGHT: 65 IN | SYSTOLIC BLOOD PRESSURE: 132 MMHG | DIASTOLIC BLOOD PRESSURE: 88 MMHG | BODY MASS INDEX: 30.49 KG/M2

## 2024-04-30 DIAGNOSIS — S83.282A OTHER TEAR OF LATERAL MENISCUS, CURRENT INJURY, LEFT KNEE, INITIAL ENCOUNTER: ICD-10-CM

## 2024-04-30 PROCEDURE — 99024 POSTOP FOLLOW-UP VISIT: CPT

## 2024-04-30 NOTE — HISTORY OF PRESENT ILLNESS
[de-identified] : S/P Left knee arthroscopic partial lateral meniscectomy; DOS: 04/18/24. [de-identified] : BALAJI RIVERA is doing well 2 week s/p left knee arthroscopic partial lateral meniscectomy. She reports a rash on her left lower leg and thigh. For this she is on an oral steroid prescribed by her PCP.  She reports no physical therapy but is ambulating well at this time. Her pain level is controlled. Overall, she is very happy with the results of the surgery so far.		  [de-identified] : Exam of the left knee shows well healing portal sites, 0 to 130 degrees of flexion measured with a goniometer.  Rash lower leg and thigh most likely from surgical prep.  5/5 motor strength bilaterally distally. Sensation intact distally.		  [de-identified] : BALAJI RIVERA is doing well 2 week s/p left knee arthroscopic partial lateral meniscectomy.  She appears to have had a reaction to the surgical prep used during her surgery. This reaction has been resolving with oral steroids as prescribed by the patient's PCP. The patient will start physical therapy and a prescription was provided. Overall the patient is very happy with their outcome so far. Follow-up in 3 weeks for repeat evaluation.

## 2024-04-30 NOTE — ADDENDUM
[FreeTextEntry1] : This note was authored by Dmitri Sanches working as a medical scribe for Dr. Kvng Quiroz. The note was reviewed, edited, and revised by Dr. Kvng Quiroz whom is in agreement with the exam findings, imaging findings, and treatment plan. 04/30/2024

## 2024-05-29 ENCOUNTER — APPOINTMENT (OUTPATIENT)
Dept: ORTHOPEDIC SURGERY | Facility: CLINIC | Age: 44
End: 2024-05-29
Payer: COMMERCIAL

## 2024-05-29 VITALS — WEIGHT: 183 LBS | BODY MASS INDEX: 30.49 KG/M2 | HEIGHT: 65 IN

## 2024-05-29 DIAGNOSIS — Z98.890 OTHER SPECIFIED POSTPROCEDURAL STATES: ICD-10-CM

## 2024-05-29 PROCEDURE — 99024 POSTOP FOLLOW-UP VISIT: CPT

## 2024-05-29 RX ORDER — HYALURONATE SODIUM 20 MG/2 ML
20 SYRINGE (ML) INTRAARTICULAR
Qty: 1 | Refills: 0 | Status: DISCONTINUED | OUTPATIENT
Start: 2023-11-08 | End: 2024-05-29

## 2024-05-29 RX ORDER — CELECOXIB 200 MG/1
200 CAPSULE ORAL
Qty: 60 | Refills: 0 | Status: DISCONTINUED | COMMUNITY
Start: 2023-06-13 | End: 2024-05-29

## 2024-05-29 RX ORDER — MELOXICAM 15 MG/1
15 TABLET ORAL
Qty: 30 | Refills: 1 | Status: DISCONTINUED | COMMUNITY
Start: 2023-05-02 | End: 2024-05-29

## 2024-05-29 NOTE — HISTORY OF PRESENT ILLNESS
[de-identified] : S/P Left knee arthroscopic partial lateral meniscectomy; DOS: 04/18/24. [de-identified] : BALAJI RIVERA is doing well 6 week s/p left knee arthroscopic partial lateral meniscectomy. She reports resolution of the rash on her left lower leg and thigh. She is going to PT which is going very well. Her pain level is controlled. Overall, she is very happy with the results of the surgery so far.		  [de-identified] : Exam of the left knee shows well healed portal sites, 0 to 130 degrees of flexion measured with a goniometer.  5/5 motor strength bilaterally distally. Sensation intact distally.		  [de-identified] : BALAJI RIVERA is doing well 6 week s/p left knee arthroscopic partial lateral meniscectomy.  She will continue with PT and Meloxicam use as needed. Overall the patient is very happy with their outcome so far. Follow-up in 3 weeks for repeat evaluation.

## 2024-06-05 ENCOUNTER — LABORATORY RESULT (OUTPATIENT)
Age: 44
End: 2024-06-05

## 2024-06-05 ENCOUNTER — APPOINTMENT (OUTPATIENT)
Dept: FAMILY MEDICINE | Facility: CLINIC | Age: 44
End: 2024-06-05
Payer: COMMERCIAL

## 2024-06-05 VITALS
TEMPERATURE: 98.6 F | WEIGHT: 181 LBS | SYSTOLIC BLOOD PRESSURE: 124 MMHG | OXYGEN SATURATION: 97 % | RESPIRATION RATE: 16 BRPM | BODY MASS INDEX: 30.16 KG/M2 | DIASTOLIC BLOOD PRESSURE: 80 MMHG | HEIGHT: 65 IN | HEART RATE: 103 BPM

## 2024-06-05 DIAGNOSIS — J45.909 UNSPECIFIED ASTHMA, UNCOMPLICATED: ICD-10-CM

## 2024-06-05 DIAGNOSIS — N20.0 CALCULUS OF KIDNEY: ICD-10-CM

## 2024-06-05 DIAGNOSIS — B00.9 HERPESVIRAL INFECTION, UNSPECIFIED: ICD-10-CM

## 2024-06-05 DIAGNOSIS — M54.50 LOW BACK PAIN, UNSPECIFIED: ICD-10-CM

## 2024-06-05 DIAGNOSIS — R94.6 ABNORMAL RESULTS OF THYROID FUNCTION STUDIES: ICD-10-CM

## 2024-06-05 DIAGNOSIS — D72.829 ELEVATED WHITE BLOOD CELL COUNT, UNSPECIFIED: ICD-10-CM

## 2024-06-05 PROCEDURE — 36415 COLL VENOUS BLD VENIPUNCTURE: CPT

## 2024-06-05 PROCEDURE — 99214 OFFICE O/P EST MOD 30 MIN: CPT

## 2024-06-05 PROCEDURE — G2211 COMPLEX E/M VISIT ADD ON: CPT | Mod: NC,1L

## 2024-06-05 RX ORDER — PANTOPRAZOLE 40 MG/1
40 TABLET, DELAYED RELEASE ORAL DAILY
Qty: 1 | Refills: 3 | Status: ACTIVE | COMMUNITY
Start: 2023-04-25 | End: 1900-01-01

## 2024-06-05 RX ORDER — PAROXETINE HYDROCHLORIDE 30 MG/1
30 TABLET, FILM COATED ORAL
Qty: 90 | Refills: 3 | Status: ACTIVE | COMMUNITY
Start: 2022-05-09 | End: 1900-01-01

## 2024-06-05 RX ORDER — LEVOCETIRIZINE DIHYDROCHLORIDE 5 MG/1
5 TABLET ORAL DAILY
Qty: 90 | Refills: 2 | Status: ACTIVE | COMMUNITY
Start: 2023-06-12 | End: 1900-01-01

## 2024-06-05 RX ORDER — EPINEPHRINE 0.3 MG/.3ML
0.3 INJECTION INTRAMUSCULAR
Qty: 1 | Refills: 5 | Status: ACTIVE | COMMUNITY
Start: 2018-11-06 | End: 1900-01-01

## 2024-06-06 ENCOUNTER — RX RENEWAL (OUTPATIENT)
Age: 44
End: 2024-06-06

## 2024-06-09 ENCOUNTER — RX RENEWAL (OUTPATIENT)
Age: 44
End: 2024-06-09

## 2024-06-11 LAB
25(OH)D3 SERPL-MCNC: 24.1 NG/ML
ALBUMIN SERPL ELPH-MCNC: 4.4 G/DL
ALP BLD-CCNC: 92 U/L
ALT SERPL-CCNC: 22 U/L
ANION GAP SERPL CALC-SCNC: 12 MMOL/L
APPEARANCE: CLEAR
AST SERPL-CCNC: 23 U/L
BASOPHILS # BLD AUTO: 0.06 K/UL
BASOPHILS NFR BLD AUTO: 0.5 %
BILIRUB SERPL-MCNC: 0.4 MG/DL
BILIRUBIN URINE: NEGATIVE
BLOOD URINE: NEGATIVE
BUN SERPL-MCNC: 11 MG/DL
CALCIUM SERPL-MCNC: 9.4 MG/DL
CHLORIDE SERPL-SCNC: 99 MMOL/L
CHOLEST SERPL-MCNC: 196 MG/DL
CO2 SERPL-SCNC: 25 MMOL/L
COLOR: YELLOW
CREAT SERPL-MCNC: 0.86 MG/DL
CREAT SPEC-SCNC: 51 MG/DL
EGFR: 85 ML/MIN/1.73M2
EOSINOPHIL # BLD AUTO: 0.2 K/UL
EOSINOPHIL NFR BLD AUTO: 1.8 %
ESTIMATED AVERAGE GLUCOSE: 111 MG/DL
FERRITIN SERPL-MCNC: 61 NG/ML
FOLATE SERPL-MCNC: >20 NG/ML
GLUCOSE QUALITATIVE U: NEGATIVE MG/DL
GLUCOSE SERPL-MCNC: 105 MG/DL
HAV IGM SER QL: NONREACTIVE
HBA1C MFR BLD HPLC: 5.5 %
HBV CORE IGM SER QL: NONREACTIVE
HBV SURFACE AG SER QL: NONREACTIVE
HCT VFR BLD CALC: 43.8 %
HCV AB SER QL: NONREACTIVE
HCV S/CO RATIO: 0.19 S/CO
HDLC SERPL-MCNC: 52 MG/DL
HGB BLD-MCNC: 14.2 G/DL
HIV1+2 AB SPEC QL IA.RAPID: NONREACTIVE
HSV 1+2 IGG SER IA-IMP: NEGATIVE
HSV1 IGG SER QL: <0.01 INDEX
HSV1-DNA: NOT DETECTED
HSV2-DNA: NOT DETECTED
IMM GRANULOCYTES NFR BLD AUTO: 0.4 %
KETONES URINE: NEGATIVE MG/DL
LDLC SERPL CALC-MCNC: 127 MG/DL
LEUKOCYTE ESTERASE URINE: ABNORMAL
LYMPHOCYTES # BLD AUTO: 3.62 K/UL
LYMPHOCYTES NFR BLD AUTO: 32.7 %
MAN DIFF?: NORMAL
MCHC RBC-ENTMCNC: 30.5 PG
MCHC RBC-ENTMCNC: 32.4 GM/DL
MCV RBC AUTO: 94 FL
MICROALBUMIN 24H UR DL<=1MG/L-MCNC: <1.2 MG/DL
MICROALBUMIN/CREAT 24H UR-RTO: NORMAL MG/G
MONOCYTES # BLD AUTO: 0.45 K/UL
MONOCYTES NFR BLD AUTO: 4.1 %
NEUTROPHILS # BLD AUTO: 6.7 K/UL
NEUTROPHILS NFR BLD AUTO: 60.5 %
NITRITE URINE: NEGATIVE
NONHDLC SERPL-MCNC: 145 MG/DL
PH URINE: 7
PLATELET # BLD AUTO: 349 K/UL
POTASSIUM SERPL-SCNC: 4.1 MMOL/L
PROT SERPL-MCNC: 7.3 G/DL
PROTEIN URINE: NEGATIVE MG/DL
RBC # BLD: 4.66 M/UL
RBC # FLD: 13.2 %
RPR SER-TITR: NORMAL
SODIUM SERPL-SCNC: 136 MMOL/L
SPECIFIC GRAVITY URINE: 1.01
T3RU NFR SERPL: 1.2 TBI
T4 FREE SERPL-MCNC: 0.9 NG/DL
TRIGL SERPL-MCNC: 95 MG/DL
TSH SERPL-ACNC: 2.34 UIU/ML
UROBILINOGEN URINE: 0.2 MG/DL
VIT B12 SERPL-MCNC: 460 PG/ML
WBC # FLD AUTO: 11.07 K/UL

## 2024-08-13 ENCOUNTER — APPOINTMENT (OUTPATIENT)
Dept: FAMILY MEDICINE | Facility: CLINIC | Age: 44
End: 2024-08-13
Payer: COMMERCIAL

## 2024-08-13 VITALS
WEIGHT: 183 LBS | DIASTOLIC BLOOD PRESSURE: 83 MMHG | HEART RATE: 97 BPM | OXYGEN SATURATION: 95 % | RESPIRATION RATE: 16 BRPM | SYSTOLIC BLOOD PRESSURE: 131 MMHG | HEIGHT: 65 IN | TEMPERATURE: 97.6 F | BODY MASS INDEX: 30.49 KG/M2

## 2024-08-13 DIAGNOSIS — Z01.818 ENCOUNTER FOR OTHER PREPROCEDURAL EXAMINATION: ICD-10-CM

## 2024-08-13 DIAGNOSIS — E04.2 NONTOXIC MULTINODULAR GOITER: ICD-10-CM

## 2024-08-13 DIAGNOSIS — S61.219D LACERATION W/OUT FOREIGN BODY OF UNSPECIFIED FINGER W/OUT DAMAGE TO NAIL, SUBSEQUENT ENCOUNTER: ICD-10-CM

## 2024-08-13 DIAGNOSIS — J45.909 UNSPECIFIED ASTHMA, UNCOMPLICATED: ICD-10-CM

## 2024-08-13 PROCEDURE — 99215 OFFICE O/P EST HI 40 MIN: CPT

## 2024-08-13 PROCEDURE — G2211 COMPLEX E/M VISIT ADD ON: CPT | Mod: NC

## 2024-10-14 ENCOUNTER — EMERGENCY (EMERGENCY)
Facility: HOSPITAL | Age: 44
LOS: 1 days | Discharge: HOME | End: 2024-10-14
Attending: EMERGENCY MEDICINE
Payer: COMMERCIAL

## 2024-10-14 VITALS
OXYGEN SATURATION: 98 % | TEMPERATURE: 98 F | SYSTOLIC BLOOD PRESSURE: 102 MMHG | RESPIRATION RATE: 18 BRPM | HEART RATE: 90 BPM | DIASTOLIC BLOOD PRESSURE: 64 MMHG

## 2024-10-14 VITALS
HEART RATE: 100 BPM | SYSTOLIC BLOOD PRESSURE: 102 MMHG | RESPIRATION RATE: 18 BRPM | DIASTOLIC BLOOD PRESSURE: 84 MMHG | TEMPERATURE: 98 F | OXYGEN SATURATION: 98 %

## 2024-10-14 PROCEDURE — 73552 X-RAY EXAM OF FEMUR 2/>: CPT

## 2024-10-14 PROCEDURE — 99284 EMERGENCY DEPT VISIT MOD MDM: CPT

## 2024-10-14 PROCEDURE — 73552 X-RAY EXAM OF FEMUR 2/>: CPT | Mod: 26,RT

## 2024-10-14 PROCEDURE — 73502 X-RAY EXAM HIP UNI 2-3 VIEWS: CPT

## 2024-10-14 PROCEDURE — 73502 X-RAY EXAM HIP UNI 2-3 VIEWS: CPT | Mod: 26,RT

## 2024-10-14 RX ORDER — OXYCODONE HYDROCHLORIDE 30 MG/1
1 TABLET, FILM COATED, EXTENDED RELEASE ORAL
Qty: 8 | Refills: 0
Start: 2024-10-14 | End: 2024-10-15

## 2024-10-14 RX ORDER — OXYCODONE HYDROCHLORIDE 30 MG/1
2.5 TABLET, FILM COATED, EXTENDED RELEASE ORAL ONCE
Refills: 0 | Status: DISCONTINUED | OUTPATIENT
Start: 2024-10-14 | End: 2024-10-14

## 2024-10-14 RX ADMIN — Medication 600 MILLIGRAM(S): at 09:01

## 2024-10-14 RX ADMIN — Medication 1500 MILLIGRAM(S): at 09:01

## 2024-10-14 RX ADMIN — OXYCODONE HYDROCHLORIDE 2.5 MILLIGRAM(S): 30 TABLET, FILM COATED, EXTENDED RELEASE ORAL at 09:03

## 2024-10-14 NOTE — ED PROVIDER NOTE - MUSCULOSKELETAL, MLM
no midline spinal  ttp.   no palpable bony abnormalities. no gross deformity, no swelling, tto over the Rt hamstring, pain with ROM of the knee and hip, strength intact,

## 2024-10-14 NOTE — ED PROVIDER NOTE - NSFOLLOWUPINSTRUCTIONS_ED_ALL_ED_FT
Patient education: Hamstring injury (The Basics)  Written by the doctors and editors at [facility]  Please read the Disclaimer at the end of this page.    What is a hamstring injury?    The hamstrings are the muscles in the back of the thigh. A hamstring injury happens when 1 of these muscles gets stretched too much or too quickly, or works too hard. This sometimes makes the muscle tear.    A hamstring injury also happens when a tendon in the back of the thigh stretches too much or tears. Tendons are strong bands of tissue that connect muscles to bones.    Hamstring injuries happen most often when people are running or exercising.    What are the symptoms of a hamstring injury?    A hamstring injury causes sudden pain in the back of the thigh. Sometimes, people feel a "pop" when the injury happens. It can also cause warmth or bruising in the back of the thigh.    Will I need tests?    Probably not. Your doctor or nurse should be able to tell if you have a hamstring injury by learning about your symptoms and doing an exam.    But some people need tests. Depending on your symptoms, your doctor or nurse might order an imaging test such as an ultrasound or MRI scan. These create pictures of the inside of the body.    How is a hamstring injury treated?    A hamstring injury usually gets better by itself, but it can take weeks to months to heal completely. Healing time depends on a person's age, how healthy they are, and how serious the injury is.    To help with your symptoms, you can:    ?Avoid doing activities or exercises that cause pain (until your symptoms improve) – In the meantime, you can continue to do other exercises and activities, as long as they don't cause pain or make your injury worse.    ?Prop the affected leg up on pillows or a chair when you rest – This is helpful only for the first few days after an injury.    ?Ice the injury – Put a cold gel pack, bag of ice, or bag of frozen vegetables on the area every 1 to 2 hours, for 15 minutes each time. Put a thin towel between the ice (or other cold object) and your skin. Use the ice (or other cold object) for at least 6 hours after the injury. Some people find it helpful to ice up to 2 days after an injury.    ?Wrap your thigh muscles with an elastic bandage, other type of wrap, or fabric "sleeve" (picture 1) – This helps support your muscle.    ?Take a pain-relieving medicine – If you have a lot of pain or a severe hamstring injury, your doctor will prescribe a strong pain medicine. If your injury is not severe, you can take an over-the-counter medicine such as acetaminophen (sample brand name: Tylenol) or ibuprofen (sample brand names: Advil, Motrin).    After your pain gets better, you can gently stretch and exercise your hamstring muscles. This can help strengthen your muscles and keep them from getting too stiff. Your doctor or nurse will show you which stretches and exercises to do. Or they will have you work with a physical therapist (exercise expert).    It's important to let your hamstring injury heal before you play sports or do other activities that use the muscles again. If you don't, you are likely to get another hamstring injury.    How can I help prevent a hamstring injury?    You should:    ?Warm up your muscles before you exercise. You can do this by walking or doing another light activity.    ?Do the exercises to strengthen your hamstrings that your doctor or physical therapist shows you. This can help lower the chance that you will get another injury.

## 2024-10-14 NOTE — ED PROVIDER NOTE - ATTENDING SHARED VISIT SELECTORS
Xray images(s) Complex Repair And V-Y Plasty Text: The defect edges were debeveled with a #15 scalpel blade.  The primary defect was closed partially with a complex linear closure.  Given the location of the remaining defect, shape of the defect and the proximity to free margins a V-Y plasty was deemed most appropriate for complete closure of the defect.  Using a sterile surgical marker, an appropriate advancement flap was drawn incorporating the defect and placing the expected incisions within the relaxed skin tension lines where possible.    The area thus outlined was incised deep to adipose tissue with a #15 scalpel blade.  The skin margins were undermined to an appropriate distance in all directions utilizing iris scissors.

## 2024-10-14 NOTE — CHART NOTE - NSCHARTNOTEFT_GEN_A_CORE
CM met w/ pt at bs.  Denies the need for any DME besides a commode.  States her sister will be taking her home today.  WC info obtained and given to Registration. 3:1 commode to be delivered to pt's home when approved.  Pt stable for dc form CM standpoint.     [facility] MASON Letty [phone]  [facility] [phone]  No  as yet.   DOI - 10/14/24

## 2024-10-14 NOTE — ED PROVIDER NOTE - CLINICAL SUMMARY MEDICAL DECISION MAKING FREE TEXT BOX
PT with no SPMHx presents to the ED with complaint of Rt leg pain s/p fall that occurred JPTA. Pt states that she slipped on water causing her to do a "split". Pt states that she had a sudden onset of pain on the back of her Rt leg that starts just under her buttock radiates hallway down her thigh. Pt states that pain feels like cramping constat made worse with activity improved with flexion of knee. Pt dines head strike, LOC, neck pain, back pain, loss of sensation, weakness. On exam, no midline spinal  ttp.   no palpable bony abnormalities. no gross deformity, no swelling, tto over the Rt hamstring, pain with ROM of the knee and hip, strength intact, Pt neuro vasc itnact, PT with no SPMHx presents to the ED with complaint of Rt leg pain s/p fall that occurred JPTA. Pt states that she slipped on water causing her to do a "split". Pt states that she had a sudden onset of pain on the back of her Rt leg that starts just under her buttock radiates hallway down her thigh. Pt states that pain feels like cramping constat made worse with activity improved with flexion of knee. Pt dines head strike, LOC, neck pain, back pain, loss of sensation, weakness. On exam, no midline spinal  ttp.   no palpable bony abnormalities. no gross deformity, no swelling, tto over the Rt hamstring, pain with ROM of the knee and hip, strength intact, Pt neuro vasc itnact, Pt with no acute findings on xray, Pt cleared for dc home with meds to the pharmacy, Pt educated about when to return to the ED if needed. PT verbalizes that he understands all instructions and results. Pt infomred that ED is open and availible 24/7 365 days a yr, encouraged to return to the ED if they have any change in condition, or feel the need for revaluation.

## 2024-10-14 NOTE — ED PROVIDER NOTE - OBJECTIVE STATEMENT
PT with no SPMHx presents to the ED with complaint of Rt leg pain s/p fall that occurred JPTA. Pt states that she slipped on water causing her to do a "split". Pt states that she had a sudden onset of pain on the back of her Rt leg that starts just under her buttock radiates hallway down her thigh. Pt states that pain feels like cramping constat made worse with activity improved with flexion of knee. Pt dines head strike, LOC, neck pain, back pain, loss of sensation, weakness.

## 2024-10-14 NOTE — PHYSICAL THERAPY INITIAL EVALUATION ADULT - GROSSLY INTACT, SENSORY
Abdomen , soft, nontender, nondistended , no guarding or rigidity , no masses palpable , normal bowel sounds , Liver and Spleen , no hepatomegaly present , no hepatosplenomegaly , liver nontender , spleen not palpable
Grossly Intact

## 2024-10-14 NOTE — ED PROVIDER NOTE - ATTENDING APP SHARED VISIT CONTRIBUTION OF CARE
indep evals/p fall, legs split, pain  dec rom bc of pain  plan pain control and imaging  no fx/dislocation  agree w pain control and outpt fu as indicated  work note

## 2024-10-14 NOTE — ED PROVIDER NOTE - CARE PROVIDER_API CALL
Alvin Yoon  Orthopaedic Surgery  [address] [place] [id number]  Phone: [phone]  Fax: [phone]  Follow Up Time:

## 2024-10-14 NOTE — ED ADULT NURSE NOTE - OBJECTIVE STATEMENT
Pt is AxOx3 c/o fall, pt slipped on water and fell on her back, denies hitting her head. Breathing even and unlabored. Pt is aware of plan of care.

## 2024-10-14 NOTE — ED ADULT NURSE NOTE - NSFALLRISKINTERV_ED_ALL_ED

## 2024-10-14 NOTE — PROVIDER CONTACT NOTE (OTHER) - ASSESSMENT
PT ordered. chart reviewed and noted. pt received in ED, semifowler position in stretcher, agreeable to PT. pt has difficulties with functional mobility due to pain. right hip pain pre/post session: 9/10, pt pre-medicated. pt left as received will continue to follow, NAD, all lines intact,  goals: 2-3x a week for 5-7 days  bed mobility: modified I   transfers: modified I   gait: 30 feet RW modified I

## 2024-10-14 NOTE — ED PROVIDER NOTE - PATIENT PORTAL LINK FT
You can access the FollowMyHealth Patient Portal offered by [facility] by registering at the following website: [email or web address]. By joining [employer]’s FollowMyHealth portal, you will also be able to view your health information using other applications (apps) compatible with our system.

## 2024-10-14 NOTE — PHYSICAL THERAPY INITIAL EVALUATION ADULT - LIVES WITH, PROFILE
mother, however can stay with brother and sister in law, sister in law can assist, no stairs to negotiate

## 2024-10-14 NOTE — ED PROVIDER NOTE - ADDITIONAL NOTES AND INSTRUCTIONS:
PT was evaluated At [facility] ED and was found to have a condition that warranted time of to rest and heal from WORK/SCHOOL.   Antonio Lee PA-C

## 2024-10-14 NOTE — ED ADULT NURSE NOTE - NSICDXPASTMEDICALHX_GEN_ALL_CORE_FT
Fady Chavez for AS/HP. PAST MEDICAL HISTORY:  No pertinent past medical history

## 2024-10-15 PROBLEM — Z78.9 OTHER SPECIFIED HEALTH STATUS: Chronic | Status: ACTIVE | Noted: 2024-10-14

## 2024-10-17 ENCOUNTER — APPOINTMENT (OUTPATIENT)
Dept: ORTHOPEDIC SURGERY | Facility: CLINIC | Age: 44
End: 2024-10-17
Payer: OTHER MISCELLANEOUS

## 2024-10-17 VITALS
HEART RATE: 120 BPM | SYSTOLIC BLOOD PRESSURE: 138 MMHG | HEIGHT: 65 IN | WEIGHT: 183 LBS | DIASTOLIC BLOOD PRESSURE: 97 MMHG | BODY MASS INDEX: 30.49 KG/M2

## 2024-10-17 DIAGNOSIS — M25.551 PAIN IN RIGHT HIP: ICD-10-CM

## 2024-10-17 PROCEDURE — 99214 OFFICE O/P EST MOD 30 MIN: CPT

## 2024-10-17 PROCEDURE — 73502 X-RAY EXAM HIP UNI 2-3 VIEWS: CPT

## 2024-10-17 PROCEDURE — G2211 COMPLEX E/M VISIT ADD ON: CPT | Mod: NC

## 2024-10-17 RX ORDER — DICLOFENAC SODIUM 50 MG/1
50 TABLET, DELAYED RELEASE ORAL
Qty: 60 | Refills: 0 | Status: ACTIVE | COMMUNITY
Start: 2024-10-17 | End: 1900-01-01

## 2024-10-25 DIAGNOSIS — R05.9 COUGH, UNSPECIFIED: ICD-10-CM

## 2024-10-25 RX ORDER — GUAIFENESIN 200 MG 200 MG/1
200 TABLET ORAL EVERY 4 HOURS
Qty: 30 | Refills: 2 | Status: ACTIVE | COMMUNITY
Start: 2024-10-25 | End: 1900-01-01

## 2024-10-30 ENCOUNTER — APPOINTMENT (OUTPATIENT)
Dept: ORTHOPEDIC SURGERY | Facility: CLINIC | Age: 44
End: 2024-10-30
Payer: OTHER MISCELLANEOUS

## 2024-10-30 DIAGNOSIS — S76.311A STRAIN OF MUSCLE, FASCIA AND TENDON OF THE POSTERIOR MUSCLE GROUP AT THIGH LEVEL, RIGHT THIGH, INITIAL ENCOUNTER: ICD-10-CM

## 2024-10-30 PROCEDURE — 99213 OFFICE O/P EST LOW 20 MIN: CPT

## 2024-10-30 PROCEDURE — G2211 COMPLEX E/M VISIT ADD ON: CPT | Mod: NC

## 2024-11-21 ENCOUNTER — NON-APPOINTMENT (OUTPATIENT)
Age: 44
End: 2024-11-21

## 2024-11-27 ENCOUNTER — APPOINTMENT (OUTPATIENT)
Dept: ORTHOPEDIC SURGERY | Facility: CLINIC | Age: 44
End: 2024-11-27
Payer: OTHER MISCELLANEOUS

## 2024-11-27 VITALS
HEART RATE: 96 BPM | DIASTOLIC BLOOD PRESSURE: 88 MMHG | BODY MASS INDEX: 30.66 KG/M2 | HEIGHT: 65 IN | SYSTOLIC BLOOD PRESSURE: 120 MMHG | WEIGHT: 184 LBS

## 2024-11-27 DIAGNOSIS — S76.311A STRAIN OF MUSCLE, FASCIA AND TENDON OF THE POSTERIOR MUSCLE GROUP AT THIGH LEVEL, RIGHT THIGH, INITIAL ENCOUNTER: ICD-10-CM

## 2024-11-27 PROCEDURE — G2211 COMPLEX E/M VISIT ADD ON: CPT | Mod: NC

## 2024-11-27 PROCEDURE — 99213 OFFICE O/P EST LOW 20 MIN: CPT

## 2024-12-10 ENCOUNTER — APPOINTMENT (OUTPATIENT)
Dept: ORTHOPEDIC SURGERY | Facility: CLINIC | Age: 44
End: 2024-12-10
Payer: OTHER MISCELLANEOUS

## 2024-12-10 VITALS
DIASTOLIC BLOOD PRESSURE: 83 MMHG | SYSTOLIC BLOOD PRESSURE: 123 MMHG | WEIGHT: 184 LBS | HEART RATE: 91 BPM | BODY MASS INDEX: 30.66 KG/M2 | HEIGHT: 65 IN

## 2024-12-10 DIAGNOSIS — S76.311A STRAIN OF MUSCLE, FASCIA AND TENDON OF THE POSTERIOR MUSCLE GROUP AT THIGH LEVEL, RIGHT THIGH, INITIAL ENCOUNTER: ICD-10-CM

## 2024-12-10 PROCEDURE — G2211 COMPLEX E/M VISIT ADD ON: CPT | Mod: NC

## 2024-12-10 PROCEDURE — 99213 OFFICE O/P EST LOW 20 MIN: CPT

## 2025-01-27 ENCOUNTER — APPOINTMENT (OUTPATIENT)
Dept: ORTHOPEDIC SURGERY | Facility: CLINIC | Age: 45
End: 2025-01-27
Payer: OTHER MISCELLANEOUS

## 2025-01-27 VITALS
HEIGHT: 65 IN | DIASTOLIC BLOOD PRESSURE: 81 MMHG | SYSTOLIC BLOOD PRESSURE: 135 MMHG | WEIGHT: 184 LBS | BODY MASS INDEX: 30.66 KG/M2

## 2025-01-27 DIAGNOSIS — S76.311A STRAIN OF MUSCLE, FASCIA AND TENDON OF THE POSTERIOR MUSCLE GROUP AT THIGH LEVEL, RIGHT THIGH, INITIAL ENCOUNTER: ICD-10-CM

## 2025-01-27 DIAGNOSIS — M25.561 PAIN IN RIGHT KNEE: ICD-10-CM

## 2025-01-27 PROCEDURE — 99213 OFFICE O/P EST LOW 20 MIN: CPT

## 2025-01-27 PROCEDURE — 73564 X-RAY EXAM KNEE 4 OR MORE: CPT | Mod: RT

## 2025-01-27 PROCEDURE — G2211 COMPLEX E/M VISIT ADD ON: CPT | Mod: NC

## 2025-05-15 ENCOUNTER — NON-APPOINTMENT (OUTPATIENT)
Age: 45
End: 2025-05-15

## 2025-06-02 ENCOUNTER — APPOINTMENT (OUTPATIENT)
Dept: FAMILY MEDICINE | Facility: CLINIC | Age: 45
End: 2025-06-02

## 2025-06-04 ENCOUNTER — APPOINTMENT (OUTPATIENT)
Dept: ORTHOPEDIC SURGERY | Facility: CLINIC | Age: 45
End: 2025-06-04

## 2025-06-19 ENCOUNTER — APPOINTMENT (OUTPATIENT)
Dept: FAMILY MEDICINE | Facility: CLINIC | Age: 45
End: 2025-06-19
Payer: COMMERCIAL

## 2025-06-19 VITALS
SYSTOLIC BLOOD PRESSURE: 126 MMHG | WEIGHT: 184 LBS | HEIGHT: 65 IN | DIASTOLIC BLOOD PRESSURE: 85 MMHG | OXYGEN SATURATION: 96 % | HEART RATE: 92 BPM | TEMPERATURE: 98.6 F | BODY MASS INDEX: 30.66 KG/M2

## 2025-06-19 PROCEDURE — G2211 COMPLEX E/M VISIT ADD ON: CPT | Mod: NC

## 2025-06-19 PROCEDURE — 99203 OFFICE O/P NEW LOW 30 MIN: CPT

## 2025-06-20 PROBLEM — Z11.3 SCREEN FOR STD (SEXUALLY TRANSMITTED DISEASE): Status: ACTIVE | Noted: 2025-06-19

## 2025-06-20 LAB
C TRACH RRNA SPEC QL NAA+PROBE: NOT DETECTED
HIV1+2 AB SPEC QL IA.RAPID: NONREACTIVE
HSV1-DNA: NOT DETECTED
HSV2-DNA: NOT DETECTED
N GONORRHOEA RRNA SPEC QL NAA+PROBE: NOT DETECTED
SOURCE AMPLIFICATION: NORMAL
SOURCE: NORMAL
T PALLIDUM AB SER QL IA: NEGATIVE

## 2025-06-23 LAB
HBV SURFACE AB SER QL: REACTIVE
HBV SURFACE AG SER QL: NONREACTIVE
HCV AB SER QL: NONREACTIVE
HCV S/CO RATIO: 0.19 S/CO
HSV 1+2 IGG SER IA-IMP: NEGATIVE
HSV1 IGG SER QL: <0.01 INDEX

## 2025-07-24 ENCOUNTER — RX RENEWAL (OUTPATIENT)
Age: 45
End: 2025-07-24

## 2025-07-28 ENCOUNTER — APPOINTMENT (OUTPATIENT)
Dept: ORTHOPEDIC SURGERY | Facility: CLINIC | Age: 45
End: 2025-07-28

## 2025-09-03 RX ORDER — SULFAMETHOXAZOLE AND TRIMETHOPRIM 800; 160 MG/1; MG/1
800-160 TABLET ORAL TWICE DAILY
Qty: 14 | Refills: 0 | Status: ACTIVE | COMMUNITY
Start: 2025-09-03 | End: 1900-01-01